# Patient Record
Sex: FEMALE | Race: WHITE | NOT HISPANIC OR LATINO | Employment: OTHER | URBAN - METROPOLITAN AREA
[De-identification: names, ages, dates, MRNs, and addresses within clinical notes are randomized per-mention and may not be internally consistent; named-entity substitution may affect disease eponyms.]

---

## 2019-09-16 DIAGNOSIS — I10 HYPERTENSION, UNSPECIFIED TYPE: Primary | ICD-10-CM

## 2019-09-16 RX ORDER — VERAPAMIL HYDROCHLORIDE 120 MG/1
120 TABLET, FILM COATED ORAL DAILY
Qty: 90 TABLET | Refills: 1 | Status: SHIPPED | OUTPATIENT
Start: 2019-09-16 | End: 2020-05-20

## 2019-09-16 RX ORDER — VERAPAMIL HYDROCHLORIDE 120 MG/1
1 TABLET, FILM COATED ORAL DAILY
COMMUNITY
End: 2019-09-16 | Stop reason: SDUPTHER

## 2019-09-16 NOTE — TELEPHONE ENCOUNTER
----- Message from Makeda Cody sent at 9/16/2019  9:21 AM CDT -----  Contact: Sophia Baum Verapemil  [facility].The patient has 3 pills left.  pts # [phone]

## 2019-10-10 ENCOUNTER — OFFICE VISIT (OUTPATIENT)
Dept: FAMILY MEDICINE | Facility: CLINIC | Age: 69
End: 2019-10-10
Payer: MEDICARE

## 2019-10-10 VITALS
WEIGHT: 123 LBS | HEIGHT: 59 IN | SYSTOLIC BLOOD PRESSURE: 136 MMHG | OXYGEN SATURATION: 99 % | DIASTOLIC BLOOD PRESSURE: 76 MMHG | BODY MASS INDEX: 24.8 KG/M2

## 2019-10-10 DIAGNOSIS — Z87.891 PERSONAL HISTORY OF NICOTINE DEPENDENCE: ICD-10-CM

## 2019-10-10 DIAGNOSIS — I10 ESSENTIAL HYPERTENSION: ICD-10-CM

## 2019-10-10 DIAGNOSIS — Z78.9 ELECTRONIC CIGARETTE USE: ICD-10-CM

## 2019-10-10 DIAGNOSIS — I34.1 MVP (MITRAL VALVE PROLAPSE): ICD-10-CM

## 2019-10-10 DIAGNOSIS — Z12.9 SCREENING FOR CANCER: ICD-10-CM

## 2019-10-10 DIAGNOSIS — F41.9 ANXIETY: ICD-10-CM

## 2019-10-10 DIAGNOSIS — E78.5 DYSLIPIDEMIA: ICD-10-CM

## 2019-10-10 DIAGNOSIS — Z87.891 FORMER SMOKER: ICD-10-CM

## 2019-10-10 DIAGNOSIS — R07.0 BURNING SENSATION OF THROAT: ICD-10-CM

## 2019-10-10 DIAGNOSIS — R00.2 PALPITATIONS: Primary | ICD-10-CM

## 2019-10-10 PROCEDURE — 93000 ELECTROCARDIOGRAM COMPLETE: CPT | Mod: S$GLB,,, | Performed by: NURSE PRACTITIONER

## 2019-10-10 PROCEDURE — 99214 PR OFFICE/OUTPT VISIT, EST, LEVL IV, 30-39 MIN: ICD-10-PCS | Mod: 25,S$GLB,, | Performed by: NURSE PRACTITIONER

## 2019-10-10 PROCEDURE — 99214 OFFICE O/P EST MOD 30 MIN: CPT | Mod: 25,S$GLB,, | Performed by: NURSE PRACTITIONER

## 2019-10-10 PROCEDURE — 93000 POCT EKG 12-LEAD: ICD-10-PCS | Mod: S$GLB,,, | Performed by: NURSE PRACTITIONER

## 2019-10-10 RX ORDER — VERAPAMIL HYDROCHLORIDE 120 MG/1
120 CAPSULE, EXTENDED RELEASE ORAL DAILY
COMMUNITY
End: 2019-11-19 | Stop reason: SDUPTHER

## 2019-10-10 RX ORDER — ROSUVASTATIN CALCIUM 5 MG/1
5 TABLET, COATED ORAL DAILY
COMMUNITY
End: 2019-11-19 | Stop reason: SDUPTHER

## 2019-10-10 RX ORDER — RALOXIFENE HYDROCHLORIDE 60 MG/1
60 TABLET, FILM COATED ORAL DAILY
COMMUNITY
End: 2019-11-19 | Stop reason: SDUPTHER

## 2019-10-10 RX ORDER — OMEPRAZOLE 40 MG/1
40 CAPSULE, DELAYED RELEASE ORAL DAILY
COMMUNITY
End: 2019-12-16 | Stop reason: SDUPTHER

## 2019-10-10 NOTE — PROGRESS NOTES
"  SUBJECTIVE:    Patient ID: Sophia Alberto is a 68 y.o. female.    Chief Complaint: Sore Throat    Pt here for sick visit- has numerous c/o's many of which have occurred for quite some time but seems to be occurring more frequently-   -reports past couple weeks has been feeling intermittent palpitations which will last a minute or two.   -Also has been having a constant burning in throat, has omeprazole but only takes occasionally. Denies any n/v. No relation of throat pain with exertion/activity  -Also reports pain to left mid back, "I feel like something is blocked", asking for CT of chest for lung CA screening as recommended by her sister  -Feels "pulse, throbbing pain" in her hands, this has gone on for several years    Had nuclear stress test 9/2018 with Dr. Cox which was normal. Had normal Holter in 2015 d/t palpitation c/o's. Reports hx of MVP  Hx of cigarette smoker- started age 16 and quit 2 years ago, hx of 1/2-3/4ppd, since she quit cigarettes starting using ecigs and most recently using Juul vaping cigarette so concerned about recent news reports about lung damage from Juuls  Pt denies any significant anxiety/stress though appears quite anxious      No visits with results within 6 Month(s) from this visit.   Latest known visit with results is:   No results found for any previous visit.       Past Medical History:   Diagnosis Date    Hyperlipidemia     Hypertension     Mitral valve prolapse      Past Surgical History:   Procedure Laterality Date    CHOLECYSTECTOMY      HYSTERECTOMY       History reviewed. No pertinent family history.    Marital Status:   Alcohol History:  has no alcohol history on file.  Tobacco History:  reports that she has been smoking vaping with nicotine. She has never used smokeless tobacco.  Drug History:  reports that she does not use drugs.    Review of patient's allergies indicates:   Allergen Reactions    Meperidine     Penicillins        Current " "Outpatient Medications:     omeprazole (PRILOSEC) 40 MG capsule, Take 40 mg by mouth once daily., Disp: , Rfl:     raloxifene (EVISTA) 60 mg tablet, Take 60 mg by mouth once daily., Disp: , Rfl:     rosuvastatin (CRESTOR) 5 MG tablet, Take 5 mg by mouth once daily., Disp: , Rfl:     verapamil (VERELAN) 120 MG C24P, Take 120 mg by mouth once daily., Disp: , Rfl:     verapamil (CALAN) 120 MG tablet, Take 1 tablet (120 mg total) by mouth once daily., Disp: 90 tablet, Rfl: 1    Review of Systems   Constitutional: Negative for chills, fever and unexpected weight change.   Respiratory: Positive for cough (reports chronic cough, nonproductive). Negative for shortness of breath and wheezing.    Cardiovascular: Positive for palpitations (see hpi). Negative for chest pain and leg swelling.   Gastrointestinal: Positive for constipation. Negative for abdominal pain, nausea and vomiting.   Genitourinary: Negative for dysuria.   Musculoskeletal: Negative for back pain (mid thoracic pain).   Skin: Negative for rash.   Neurological: Negative for dizziness, syncope, numbness and headaches.          Objective:      Vitals:    10/10/19 1359 10/10/19 1407 10/10/19 1431   BP: (!) 140/80 (!) 164/90 136/76   Pulse: (P) 78     SpO2: 99%     Weight: 55.8 kg (123 lb)     Height: 4' 11" (1.499 m)       Physical Exam   Constitutional: She is oriented to person, place, and time. She appears well-developed and well-nourished.   HENT:   Mouth/Throat: Oropharynx is clear and moist.   Neck: Neck supple. Carotid bruit is not present.   Cardiovascular: Normal rate and regular rhythm. Exam reveals no gallop and no friction rub.   No murmur heard.  Pulmonary/Chest: Effort normal and breath sounds normal. She has no wheezes. She has no rales.   Abdominal: Soft. She exhibits no distension. There is no tenderness.   Musculoskeletal: She exhibits no edema.   Neurological: She is alert and oriented to person, place, and time.   Psychiatric: Her mood " appears anxious.         Assessment:       1. Palpitations    2. Burning sensation of throat    3. Dyslipidemia    4. Essential hypertension    5. Anxiety    6. MVP (mitral valve prolapse)    7. Former smoker    8. Electronic cigarette use    9. Screening for cancer    10. Personal history of nicotine dependence            Plan:       Palpitations  -     POCT EKG 12-LEAD (NOT FOR [facility] USE)  -patient here today with numerous complaints though none of them sound like they are actually new to her.  EKG today reveals sinus rhythm without any acute ST or T-wave changes.  Discussed possible Holter monitor to rule out arrhythmia however she declines this states she has had before and nothing has ever showed up.  She denies any dizziness or presyncopal symptoms with the brief palpitations that she is experiencing.    Burning sensation of throat  -     POCT EKG 12-LEAD (NOT FOR [facility] USE)  -EKG without ischemic changes advised to take the omeprazole daily for the next 2-3 weeks see if this helps some her throat burning symptoms    Former smoker    Dyslipidemia  -well controlled on last labs however she is due for repeat labs    Essential hypertension  -Blood pressure improved on recheck    Anxiety-  Patient appears to be quite anxious however she denies anxiety or stress could be a possible contributing factor to any of her current complaints    Screening for cancer  -     CT Chest Lung Screening Low Dose; Future; Expected date: 10/10/2019    MVP (mitral valve prolapse)    Electronic cigarette use    Personal history of nicotine dependence   -     CT Chest Lung Screening Low Dose; Future; Expected date: 10/10/2019  -patient complaining of left thoracic pain, appears to be more muscular however she is quite concerned this could be pulmonary in origin- will send for lung cancer screening CT scan given her history of tobacco use    Follow up in about 3 weeks (around 10/31/2019).        10/10/2019 Hellen Gibbons NP

## 2019-10-11 LAB — EKG 12-LEAD: NORMAL

## 2019-10-15 ENCOUNTER — HOSPITAL ENCOUNTER (OUTPATIENT)
Dept: RADIOLOGY | Facility: HOSPITAL | Age: 69
Discharge: HOME | End: 2019-10-15
Attending: NURSE PRACTITIONER
Payer: MEDICARE

## 2019-10-15 DIAGNOSIS — Z87.891 PERSONAL HISTORY OF NICOTINE DEPENDENCE: ICD-10-CM

## 2019-10-15 DIAGNOSIS — Z12.9 SCREENING FOR CANCER: ICD-10-CM

## 2019-10-15 PROCEDURE — G0297 LDCT FOR LUNG CA SCREEN: HCPCS | Mod: TC,PO

## 2019-10-15 NOTE — PROGRESS NOTES
Please call pt and let her know CT scan of chest shows mild to moderate emphysema changes to upper lungs. No nodules or masses. No enlarged lymph nodes. Heart is normal size

## 2019-10-16 ENCOUNTER — TELEPHONE (OUTPATIENT)
Dept: FAMILY MEDICINE | Facility: CLINIC | Age: 69
End: 2019-10-16

## 2019-10-16 NOTE — TELEPHONE ENCOUNTER
----- Message from Hellen Gibbons NP sent at 10/15/2019  5:03 PM CDT -----  Please call pt and let her know CT scan of chest shows mild to moderate emphysema changes to upper lungs. No nodules or masses. No enlarged lymph nodes. Heart is normal size

## 2019-10-16 NOTE — TELEPHONE ENCOUNTER
Spoke to patient with information verbatim from Hellen. Regarding emphysema. Verbalized understanding on all.

## 2019-10-16 NOTE — TELEPHONE ENCOUNTER
Spoke to patient with results. States that she was unaware that she had emphysema. Wants to know if there is anything to be concerned about with this.

## 2019-10-16 NOTE — TELEPHONE ENCOUNTER
Recommend she quit all tobacco products including electronic cigs/vaping. If she has any SOB or exertion we can send in rx for albuterol INH to use as needed. Regular exercise can help maintain lung function also

## 2019-11-11 ENCOUNTER — TELEPHONE (OUTPATIENT)
Dept: FAMILY MEDICINE | Facility: CLINIC | Age: 69
End: 2019-11-11

## 2019-11-11 NOTE — TELEPHONE ENCOUNTER
----- Message from Carol Cm sent at 11/11/2019 11:13 AM CST -----  Contact: Sophia Remy  Patient left , she is returning your call.   Pt# [phone]

## 2019-11-11 NOTE — TELEPHONE ENCOUNTER
Spoke to patient that I was calling to advise her that she needs fasting lab work prior to 11/19 ov. Verbalized understanding.

## 2019-11-13 LAB
ALBUMIN SERPL-MCNC: 3.9 G/DL (ref 3.6–5.1)
ALBUMIN/CREAT UR: 8 MCG/MG CREAT
ALBUMIN/GLOB SERPL: 1.8 (CALC) (ref 1–2.5)
ALP SERPL-CCNC: 65 U/L (ref 33–130)
ALT SERPL-CCNC: 24 U/L (ref 6–29)
APPEARANCE UR: CLEAR
AST SERPL-CCNC: 21 U/L (ref 10–35)
BACTERIA #/AREA URNS HPF: NORMAL /HPF
BACTERIA UR CULT: NORMAL
BASOPHILS # BLD AUTO: 67 CELLS/UL (ref 0–200)
BASOPHILS NFR BLD AUTO: 0.9 %
BILIRUB SERPL-MCNC: 0.5 MG/DL (ref 0.2–1.2)
BILIRUB UR QL STRIP: NEGATIVE
BUN SERPL-MCNC: 12 MG/DL (ref 7–25)
BUN/CREAT SERPL: ABNORMAL (CALC) (ref 6–22)
CALCIUM SERPL-MCNC: 9.1 MG/DL (ref 8.6–10.4)
CHLORIDE SERPL-SCNC: 107 MMOL/L (ref 98–110)
CHOLEST SERPL-MCNC: 127 MG/DL
CHOLEST/HDLC SERPL: 2.3 (CALC)
CO2 SERPL-SCNC: 28 MMOL/L (ref 20–32)
COLOR UR: YELLOW
CREAT SERPL-MCNC: 0.75 MG/DL (ref 0.6–0.93)
CREAT UR-MCNC: 183 MG/DL (ref 20–275)
EOSINOPHIL # BLD AUTO: 170 CELLS/UL (ref 15–500)
EOSINOPHIL NFR BLD AUTO: 2.3 %
ERYTHROCYTE [DISTWIDTH] IN BLOOD BY AUTOMATED COUNT: 12.6 % (ref 11–15)
GFRSERPLBLD MDRD-ARVRAT: 81 ML/MIN/1.73M2
GLOBULIN SER CALC-MCNC: 2.2 G/DL (CALC) (ref 1.9–3.7)
GLUCOSE SERPL-MCNC: 110 MG/DL (ref 65–99)
GLUCOSE UR QL STRIP: NEGATIVE
HCT VFR BLD AUTO: 41.8 % (ref 35–45)
HDLC SERPL-MCNC: 56 MG/DL
HGB BLD-MCNC: 14.1 G/DL (ref 11.7–15.5)
HGB UR QL STRIP: NEGATIVE
HYALINE CASTS #/AREA URNS LPF: NORMAL /LPF
KETONES UR QL STRIP: NEGATIVE
LDLC SERPL CALC-MCNC: 53 MG/DL (CALC)
LEUKOCYTE ESTERASE UR QL STRIP: NEGATIVE
LYMPHOCYTES # BLD AUTO: 2279 CELLS/UL (ref 850–3900)
LYMPHOCYTES NFR BLD AUTO: 30.8 %
MCH RBC QN AUTO: 31.4 PG (ref 27–33)
MCHC RBC AUTO-ENTMCNC: 33.7 G/DL (ref 32–36)
MCV RBC AUTO: 93.1 FL (ref 80–100)
MICROALBUMIN UR-MCNC: 1.5 MG/DL
MONOCYTES # BLD AUTO: 555 CELLS/UL (ref 200–950)
MONOCYTES NFR BLD AUTO: 7.5 %
NEUTROPHILS # BLD AUTO: 4329 CELLS/UL (ref 1500–7800)
NEUTROPHILS NFR BLD AUTO: 58.5 %
NITRITE UR QL STRIP: NEGATIVE
NONHDLC SERPL-MCNC: 71 MG/DL (CALC)
PH UR STRIP: 6 [PH] (ref 5–8)
PLATELET # BLD AUTO: 253 THOUSAND/UL (ref 140–400)
PMV BLD REES-ECKER: 10.8 FL (ref 7.5–12.5)
POTASSIUM SERPL-SCNC: 4.3 MMOL/L (ref 3.5–5.3)
PROT SERPL-MCNC: 6.1 G/DL (ref 6.1–8.1)
PROT UR QL STRIP: NEGATIVE
RBC # BLD AUTO: 4.49 MILLION/UL (ref 3.8–5.1)
RBC #/AREA URNS HPF: NORMAL /HPF
SODIUM SERPL-SCNC: 143 MMOL/L (ref 135–146)
SP GR UR STRIP: 1.02 (ref 1–1.03)
SQUAMOUS #/AREA URNS HPF: NORMAL /HPF
TRIGL SERPL-MCNC: 98 MG/DL
TSH SERPL-ACNC: 1.49 MIU/L (ref 0.4–4.5)
WBC # BLD AUTO: 7.4 THOUSAND/UL (ref 3.8–10.8)
WBC #/AREA URNS HPF: NORMAL /HPF

## 2019-11-18 PROBLEM — K57.30 DIVERTICULOSIS OF LARGE INTESTINE WITHOUT PERFORATION OR ABSCESS WITHOUT BLEEDING: Status: ACTIVE | Noted: 2018-11-14

## 2019-11-18 PROBLEM — M17.10 ARTHRITIS OF KNEE: Status: ACTIVE | Noted: 2017-11-14

## 2019-11-19 ENCOUNTER — OFFICE VISIT (OUTPATIENT)
Dept: FAMILY MEDICINE | Facility: CLINIC | Age: 69
End: 2019-11-19
Payer: MEDICARE

## 2019-11-19 VITALS
DIASTOLIC BLOOD PRESSURE: 70 MMHG | HEART RATE: 88 BPM | WEIGHT: 122 LBS | SYSTOLIC BLOOD PRESSURE: 138 MMHG | BODY MASS INDEX: 24.6 KG/M2 | HEIGHT: 59 IN

## 2019-11-19 DIAGNOSIS — E03.9 ACQUIRED HYPOTHYROIDISM: ICD-10-CM

## 2019-11-19 DIAGNOSIS — M81.8 ADULT IDIOPATHIC GENERALIZED OSTEOPOROSIS: ICD-10-CM

## 2019-11-19 DIAGNOSIS — Z12.31 OTHER SCREENING MAMMOGRAM: ICD-10-CM

## 2019-11-19 DIAGNOSIS — F17.210 CIGARETTE NICOTINE DEPENDENCE WITHOUT COMPLICATION: ICD-10-CM

## 2019-11-19 DIAGNOSIS — M75.42 IMPINGEMENT SYNDROME OF LEFT SHOULDER: ICD-10-CM

## 2019-11-19 DIAGNOSIS — K21.9 GASTROESOPHAGEAL REFLUX DISEASE WITHOUT ESOPHAGITIS: ICD-10-CM

## 2019-11-19 DIAGNOSIS — I34.1 MVP (MITRAL VALVE PROLAPSE): ICD-10-CM

## 2019-11-19 DIAGNOSIS — I10 ESSENTIAL HYPERTENSION: ICD-10-CM

## 2019-11-19 DIAGNOSIS — R00.2 PALPITATIONS: ICD-10-CM

## 2019-11-19 DIAGNOSIS — E78.00 PURE HYPERCHOLESTEROLEMIA: Primary | ICD-10-CM

## 2019-11-19 PROCEDURE — 99214 PR OFFICE/OUTPT VISIT, EST, LEVL IV, 30-39 MIN: ICD-10-PCS | Mod: S$GLB,,, | Performed by: FAMILY MEDICINE

## 2019-11-19 PROCEDURE — 1159F MED LIST DOCD IN RCRD: CPT | Mod: S$GLB,,, | Performed by: FAMILY MEDICINE

## 2019-11-19 PROCEDURE — 99214 OFFICE O/P EST MOD 30 MIN: CPT | Mod: S$GLB,,, | Performed by: FAMILY MEDICINE

## 2019-11-19 PROCEDURE — 1159F PR MEDICATION LIST DOCUMENTED IN MEDICAL RECORD: ICD-10-PCS | Mod: S$GLB,,, | Performed by: FAMILY MEDICINE

## 2019-11-19 RX ORDER — AMLODIPINE BESYLATE 5 MG/1
5 TABLET ORAL DAILY
Qty: 90 TABLET | Refills: 1 | Status: SHIPPED | OUTPATIENT
Start: 2019-11-19 | End: 2020-05-20 | Stop reason: SDUPTHER

## 2019-11-19 RX ORDER — LEVOTHYROXINE SODIUM 50 UG/1
50 TABLET ORAL
Qty: 90 TABLET | Refills: 1 | Status: SHIPPED | OUTPATIENT
Start: 2019-11-19 | End: 2019-12-16

## 2019-11-19 RX ORDER — OMEPRAZOLE 40 MG/1
40 CAPSULE, DELAYED RELEASE ORAL DAILY
Qty: 90 CAPSULE | Refills: 3 | Status: CANCELLED | OUTPATIENT
Start: 2019-11-19

## 2019-11-19 RX ORDER — LEVOTHYROXINE SODIUM 50 UG/1
50 TABLET ORAL
Qty: 90 TABLET | Refills: 1 | Status: CANCELLED | OUTPATIENT
Start: 2019-11-19

## 2019-11-19 RX ORDER — RALOXIFENE HYDROCHLORIDE 60 MG/1
60 TABLET, FILM COATED ORAL DAILY
Qty: 90 TABLET | Refills: 1 | Status: CANCELLED | OUTPATIENT
Start: 2019-11-19

## 2019-11-19 RX ORDER — ROSUVASTATIN CALCIUM 5 MG/1
5 TABLET, COATED ORAL DAILY
Qty: 90 TABLET | Refills: 1 | Status: SHIPPED | OUTPATIENT
Start: 2019-11-19 | End: 2020-05-20 | Stop reason: SDUPTHER

## 2019-11-19 RX ORDER — RALOXIFENE HYDROCHLORIDE 60 MG/1
60 TABLET, FILM COATED ORAL DAILY
Qty: 90 TABLET | Refills: 1 | Status: SHIPPED | OUTPATIENT
Start: 2019-11-19 | End: 2020-05-20 | Stop reason: SDUPTHER

## 2019-11-19 RX ORDER — LEVOTHYROXINE SODIUM 50 UG/1
50 TABLET ORAL
COMMUNITY
Start: 2019-08-25 | End: 2019-11-19 | Stop reason: SDUPTHER

## 2019-11-19 RX ORDER — ROSUVASTATIN CALCIUM 5 MG/1
5 TABLET, COATED ORAL DAILY
Qty: 90 TABLET | Refills: 1 | Status: CANCELLED | OUTPATIENT
Start: 2019-11-19

## 2019-11-19 NOTE — PROGRESS NOTES
SUBJECTIVE:    Patient ID: Sophia Alberto is a 69 y.o. female.    Chief Complaint: Follow-up (bottles brought, wants to discuss changing Verapamil. refused flu shot-ac )    69 year old female presents for routine exam. Recent LDCT scan of lungs completed showing mild emphysema, otherwise no nodules. C/o continued L shoulder pain preventing full use of the arm. Denies taking any medications for the pain. Smoked cigarettes for 50 years and has recently taken up vaping nicotine instead. Requesting change verapamil to another medicine for blood pressure d/t unable to get it from her pharmacy.      Office Visit on 10/10/2019   Component Date Value Ref Range Status    Cholesterol 11/12/2019 127  <200 mg/dL Final    HDL 11/12/2019 56  >50 mg/dL Final    Triglycerides 11/12/2019 98  <150 mg/dL Final    LDL Cholesterol 11/12/2019 53  mg/dL (calc) Final    Hdl/Cholesterol Ratio 11/12/2019 2.3  <5.0 (calc) Final    Non HDL Chol. (LDL+VLDL) 11/12/2019 71  <130 mg/dL (calc) Final    TSH w/reflex to FT4 11/12/2019 1.49  0.40 - 4.50 mIU/L Final    WBC 11/12/2019 7.4  3.8 - 10.8 Thousand/uL Final    RBC 11/12/2019 4.49  3.80 - 5.10 Million/uL Final    Hemoglobin 11/12/2019 14.1  11.7 - 15.5 g/dL Final    Hematocrit 11/12/2019 41.8  35.0 - 45.0 % Final    Mean Corpuscular Volume 11/12/2019 93.1  80.0 - 100.0 fL Final    Mean Corpuscular Hemoglobin 11/12/2019 31.4  27.0 - 33.0 pg Final    Mean Corpuscular Hemoglobin Conc 11/12/2019 33.7  32.0 - 36.0 g/dL Final    RDW 11/12/2019 12.6  11.0 - 15.0 % Final    Platelets 11/12/2019 253  140 - 400 Thousand/uL Final    MPV 11/12/2019 10.8  7.5 - 12.5 fL Final    Neutrophils Absolute 11/12/2019 4,329  1,500 - 7,800 cells/uL Final    Lymph # 11/12/2019 2,279  850 - 3,900 cells/uL Final    Mono # 11/12/2019 555  200 - 950 cells/uL Final    Eos # 11/12/2019 170  15 - 500 cells/uL Final    Baso # 11/12/2019 67  0 - 200 cells/uL Final    Neutrophils Relative 11/12/2019  58.5  % Final    Lymph% 11/12/2019 30.8  % Final    Mono% 11/12/2019 7.5  % Final    Eosinophil% 11/12/2019 2.3  % Final    Basophil% 11/12/2019 0.9  % Final    Glucose 11/12/2019 110* 65 - 99 mg/dL Final    BUN, Bld 11/12/2019 12  7 - 25 mg/dL Final    Creatinine 11/12/2019 0.75  0.60 - 0.93 mg/dL Final    eGFR if non African American 11/12/2019 81  > OR = 60 mL/min/1.73m2 Final    eGFR if  11/12/2019 94  > OR = 60 mL/min/1.73m2 Final    BUN/Creatinine Ratio 11/12/2019 NOT APPLICABLE  6 - 22 (calc) Final    Sodium 11/12/2019 143  135 - 146 mmol/L Final    Potassium 11/12/2019 4.3  3.5 - 5.3 mmol/L Final    Chloride 11/12/2019 107  98 - 110 mmol/L Final    CO2 11/12/2019 28  20 - 32 mmol/L Final    Calcium 11/12/2019 9.1  8.6 - 10.4 mg/dL Final    Total Protein 11/12/2019 6.1  6.1 - 8.1 g/dL Final    Albumin 11/12/2019 3.9  3.6 - 5.1 g/dL Final    Globulin, Total 11/12/2019 2.2  1.9 - 3.7 g/dL (calc) Final    Albumin/Globulin Ratio 11/12/2019 1.8  1.0 - 2.5 (calc) Final    Total Bilirubin 11/12/2019 0.5  0.2 - 1.2 mg/dL Final    Alkaline Phosphatase 11/12/2019 65  33 - 130 U/L Final    AST 11/12/2019 21  10 - 35 U/L Final    ALT 11/12/2019 24  6 - 29 U/L Final    Color, UA 11/12/2019 YELLOW  YELLOW Final    Appearance, UA 11/12/2019 CLEAR  CLEAR Final    Specific [place]it, UA 11/12/2019 1.021  1.001 - 1.035 Final    pH, UA 11/12/2019 6.0  5.0 - 8.0 Final    Glucose, UA 11/12/2019 NEGATIVE  NEGATIVE Final    Bilirubin, UA 11/12/2019 NEGATIVE  NEGATIVE Final    Ketones, UA 11/12/2019 NEGATIVE  NEGATIVE Final    Occult Blood UA 11/12/2019 NEGATIVE  NEGATIVE Final    Protein, UA 11/12/2019 NEGATIVE  NEGATIVE Final    Nitrite, UA 11/12/2019 NEGATIVE  NEGATIVE Final    Leukocytes, UA 11/12/2019 NEGATIVE  NEGATIVE Final    WBC Casts, UA 11/12/2019 NONE SEEN  < OR = 5 /HPF Final    RBC Casts, UA 11/12/2019 0-2  < OR = 2 /HPF Final    Squam Epithel, UA 11/12/2019 0-5  < OR  = 5 /HPF Final    Bacteria, UA 11/12/2019 NONE SEEN  NONE SEEN /HPF Final    Hyaline Casts, UA 11/12/2019 NONE SEEN  NONE SEEN /LPF Final    Reflexive Urine Culture 11/12/2019 NO CULTURE INDICATED   Final    Creatinine, Random Ur 11/12/2019 183  20 - 275 mg/dL Final    Microalb, Ur 11/12/2019 1.5  See Note: mg/dL Final    Microalb Creat Ratio 11/12/2019 8  <30 mcg/mg creat Final       Past Medical History:   Diagnosis Date    History of nuclear stress test 2015    Normal    Hyperlipidemia     Hypertension     Mitral valve prolapse      Past Surgical History:   Procedure Laterality Date    CHOLECYSTECTOMY      COLONOSCOPY  2014    Dr. Avilez RTC 10 years    HYSTERECTOMY      LAPAROSCOPY       Family History   Problem Relation Age of Onset    Diabetes Mother     Heart disease Mother     Cancer Father     Breast cancer Maternal Grandmother        Marital Status:   Alcohol History:  has no alcohol history on file.  Tobacco History:  reports that she has been smoking vaping with nicotine. She has never used smokeless tobacco.  Drug History:  reports that she does not use drugs.    Review of patient's allergies indicates:   Allergen Reactions    Meperidine     Penicillins        Current Outpatient Medications:     omeprazole (PRILOSEC) 40 MG capsule, Take 40 mg by mouth once daily., Disp: , Rfl:     raloxifene (EVISTA) 60 mg tablet, Take 60 mg by mouth once daily., Disp: , Rfl:     rosuvastatin (CRESTOR) 5 MG tablet, Take 5 mg by mouth once daily., Disp: , Rfl:     SYNTHROID 50 mcg tablet, Take 50 mcg by mouth before breakfast. , Disp: , Rfl:     verapamil (CALAN) 120 MG tablet, Take 1 tablet (120 mg total) by mouth once daily., Disp: 90 tablet, Rfl: 1    Review of Systems   Constitutional: Negative for appetite change, chills, fatigue, fever and unexpected weight change.   HENT: Negative for congestion, ear pain, sinus pain, sore throat and trouble swallowing.    Eyes: Negative for pain,  "discharge and visual disturbance.   Respiratory: Negative for apnea, cough, shortness of breath and wheezing.         Uses vape pen with nicotine   Cardiovascular: Negative for chest pain, palpitations and leg swelling.   Gastrointestinal: Positive for constipation (requiring daily stool softeners.). Negative for abdominal pain, blood in stool, diarrhea, nausea and vomiting.        Uses omeprazole prn for symptoms of reflux   Endocrine: Negative for heat intolerance, polydipsia and polyuria.   Genitourinary: Negative for difficulty urinating, dyspareunia, dysuria, frequency, hematuria and menstrual problem.   Musculoskeletal: Positive for arthralgias (Pain in L shoulder limiting use. R knee pain.). Negative for back pain, gait problem, joint swelling and myalgias.   Allergic/Immunologic: Negative for environmental allergies, food allergies and immunocompromised state.   Neurological: Negative for dizziness, tremors, seizures, numbness and headaches.   Psychiatric/Behavioral: Negative for behavioral problems, confusion, hallucinations and suicidal ideas. The patient is not nervous/anxious.           Objective:      Vitals:    11/19/19 1119   BP: 138/70   Pulse: 88   Weight: 55.3 kg (122 lb)   Height: 4' 11" (1.499 m)     Body mass index is 24.64 kg/m².  Physical Exam   Constitutional: She is oriented to person, place, and time. She appears well-developed and well-nourished.   HENT:   Head: Normocephalic and atraumatic.   Right Ear: External ear normal.   Left Ear: External ear normal.   Nose: Nose normal.   Mouth/Throat: Oropharynx is clear and moist.   Eyes: Pupils are equal, round, and reactive to light. EOM are normal.   Neck: Normal range of motion. Neck supple. Carotid bruit is not present. No thyromegaly present.   Cardiovascular: Normal rate, regular rhythm, normal heart sounds and intact distal pulses.   No murmur heard.  Pulmonary/Chest: Effort normal and breath sounds normal. She has no wheezes. She has no " rales.   Abdominal: Soft. Bowel sounds are normal. She exhibits no distension. There is no hepatosplenomegaly. There is no tenderness.   Musculoskeletal: Normal range of motion. She exhibits no tenderness or deformity.        Lumbar back: Normal. She exhibits no pain and no spasm.   Bends 90 degrees at  Waist. 150 degree flexion of L shoulder. Limited ROM. Shaheen knees crepitant.   Lymphadenopathy:     She has no cervical adenopathy.   Neurological: She is alert and oriented to person, place, and time. No cranial nerve deficit. Coordination normal.   Skin: Skin is warm and dry. No rash noted.   Psychiatric: She has a normal mood and affect. Her behavior is normal. Judgment and thought content normal.   Nursing note and vitals reviewed.        Assessment:       1. Pure hypercholesterolemia    2. Essential hypertension    3. MVP (mitral valve prolapse)    4. Palpitations    5. Acquired hypothyroidism    6. Gastroesophageal reflux disease without esophagitis    7. Adult idiopathic generalized osteoporosis    8. Cigarette nicotine dependence without complication    9. Other screening mammogram    10. Impingement syndrome of left shoulder         Plan:       Pure hypercholesterolemia  - Cholesterol at goal. Continue rosuvastatin 5mg daily.    Essential hypertension  - Blood pressure at goal.  - Change Verapamil to Amlodipine 5mg daily.    MVP (mitral valve prolapse)  - Stress test in 2018. Stable at this time.    Palpitations    Acquired hypothyroidism  - TSH at goal. Continue levothyroxine 50mcg daily.    Gastroesophageal reflux disease without esophagitis  - Continue taking omeprazole as needed for symtpoms of reflux.    Adult idiopathic generalized osteoporosis  - Continue Raloxifene 60mg daily.  - DEXA due early next year.    Cigarette nicotine dependence without complication  - LDCT scan showed mild emphysema. No symptoms at this time.  - Counseled patient on risks associated with vaping nicotine.    Other screening  mammogram  -     Mammo Digital Screening Bilat w/ John; Future; Expected date: 11/19/2019    Impingement syndrome of left shoulder  - Instructed patient to take Advil as needed for pain.  - Given shoulder exercises to help strengthen shoulder and alleviate pain.  - Follow up if pain continues after using exercises or worsens.      Follow up in about 6 months (around 5/19/2020).

## 2019-11-25 ENCOUNTER — TELEPHONE (OUTPATIENT)
Dept: FAMILY MEDICINE | Facility: CLINIC | Age: 69
End: 2019-11-25

## 2019-11-25 DIAGNOSIS — R92.8 ABNORMAL SCREENING MAMMOGRAM: Primary | ICD-10-CM

## 2019-11-25 NOTE — TELEPHONE ENCOUNTER
She needs a diagnostic mammo with ultrasound. I placed the order for external mammogram and ultrasound. Is there anything else that needs to be done for [facility]?

## 2019-11-25 NOTE — TELEPHONE ENCOUNTER
----- Message from Cuca Thomas MA sent at 11/25/2019  7:59 AM CST -----  Regarding: Incomplete Mammogram      ----- Message -----  From: Raisa Toney  Sent: 11/24/2019   8:56 AM CST  To: Deacon Davies Staff    Screening mammogram 11/19/19

## 2019-11-25 NOTE — TELEPHONE ENCOUNTER
Yes, needs to be printed and faxed to [facility]. Pt needs to be called and a reminder needs to be set to make sure the additional views are done.

## 2019-12-02 ENCOUNTER — TELEPHONE (OUTPATIENT)
Dept: FAMILY MEDICINE | Facility: CLINIC | Age: 69
End: 2019-12-02

## 2019-12-02 DIAGNOSIS — R92.8 ABNORMAL SCREENING MAMMOGRAM: Primary | ICD-10-CM

## 2019-12-02 NOTE — TELEPHONE ENCOUNTER
----- Message from Sernee Britton sent at 12/2/2019  9:21 AM CST -----  [facility] is calling and they recommenced an us. We sent over a diag. mammo and no us. They need clarification on the order   [phone] fide

## 2019-12-02 NOTE — TELEPHONE ENCOUNTER
----- Message from Carol Cm sent at 12/2/2019  2:39 PM CST -----  Contact: Rosalind w/ [facility]  Pt has orders for a mammo to be and the radiologist recommended she gets a ultrasound. Rosalind would like to know does Dr. Parker would like for the patient to do both digital and ultrasound mammo? If he does, she says to please fax over the order  Rosalind's# [phone] Fax# [phone]

## 2019-12-03 ENCOUNTER — TELEPHONE (OUTPATIENT)
Dept: FAMILY MEDICINE | Facility: CLINIC | Age: 69
End: 2019-12-03

## 2019-12-03 NOTE — TELEPHONE ENCOUNTER
Spoke with pt and explained that she needs a diagnostic mammogram and u/s according to the report. Order was already put in by abbi. Pt will go today at 1

## 2019-12-03 NOTE — TELEPHONE ENCOUNTER
----- Message from Gonzalo Waller sent at 12/3/2019  9:43 AM CST -----  Pt is saying [facility] is unuse of what test the pt is suppose to be having pt wants to know why she is needing it.   Pt [phone]

## 2019-12-03 NOTE — TELEPHONE ENCOUNTER
----- Message from Hellen Perez sent at 12/3/2019  9:42 AM CST -----  Pt has an appt today @ 12:00. [facility] needs an order for a right breast ultrasound. Fax #[phone].

## 2019-12-05 ENCOUNTER — TELEPHONE (OUTPATIENT)
Dept: FAMILY MEDICINE | Facility: CLINIC | Age: 69
End: 2019-12-05

## 2019-12-05 NOTE — TELEPHONE ENCOUNTER
----- Message from Cuca Thomas MA sent at 12/4/2019 10:28 AM CST -----      ----- Message -----  From: Merlene Dey  Sent: 12/4/2019  10:00 AM CST  To: Elvin Gonzalez Staff    RADIOLOGY, D.I.S. 12/03/19

## 2019-12-05 NOTE — TELEPHONE ENCOUNTER
She has a normal diagnostic mammo with ultrasound. We will repeat another in 6 months to document stability.

## 2019-12-16 DIAGNOSIS — K21.9 GASTROESOPHAGEAL REFLUX DISEASE WITHOUT ESOPHAGITIS: Primary | ICD-10-CM

## 2019-12-16 DIAGNOSIS — E03.9 ACQUIRED HYPOTHYROIDISM: Primary | ICD-10-CM

## 2019-12-16 RX ORDER — OMEPRAZOLE 40 MG/1
40 CAPSULE, DELAYED RELEASE ORAL DAILY
Qty: 90 CAPSULE | Refills: 3 | Status: SHIPPED | OUTPATIENT
Start: 2019-12-16 | End: 2020-03-06 | Stop reason: SDUPTHER

## 2019-12-16 RX ORDER — LEVOTHYROXINE SODIUM 50 UG/1
50 TABLET ORAL DAILY
Qty: 30 TABLET | Refills: 11 | Status: SHIPPED | OUTPATIENT
Start: 2019-12-16 | End: 2019-12-17

## 2019-12-16 NOTE — TELEPHONE ENCOUNTER
----- Message from Carol Cm sent at 12/16/2019  9:20 AM CST -----  Contact: Sophia Alberto  Pt says that [facility] is telling her that she now needs a PA for the synthroid if not then she has to get the generic Levothyroxine sent into instead. Pt says that she's been taking Synthroid for a while now and isn't sure if she should take the generic or stick with what she knows. She says she doesn't have a problem with taking the generic because it is more affordable for her .   [phone]  Pt# [phone]

## 2019-12-16 NOTE — TELEPHONE ENCOUNTER
----- Message from Hellen Perez sent at 12/16/2019  9:00 AM CST -----  Refill for omeprazole (PRILOSEC) 40 MG capsule. [facility]. Pt #[phone]

## 2019-12-16 NOTE — TELEPHONE ENCOUNTER
Left message to advise pt that we will [facility] and that we need to recheck TSH in 8 weeks after starting 50 mcg.

## 2019-12-16 NOTE — TELEPHONE ENCOUNTER
I am ok with the generic form (levothyroxine) we will need to recheck TSH in 8 weeks after starting 50mcg.

## 2019-12-17 RX ORDER — LEVOTHYROXINE SODIUM 50 UG/1
50 TABLET ORAL DAILY
Qty: 90 TABLET | Refills: 0 | Status: SHIPPED | OUTPATIENT
Start: 2019-12-17 | End: 2020-03-06 | Stop reason: SDUPTHER

## 2019-12-17 NOTE — TELEPHONE ENCOUNTER
Pt has been notified and verbalized understanding.  Pt requested a 90 day supply be ordered.    Pended below.

## 2019-12-27 ENCOUNTER — TELEPHONE (OUTPATIENT)
Dept: FAMILY MEDICINE | Facility: CLINIC | Age: 69
End: 2019-12-27

## 2019-12-27 ENCOUNTER — CLINICAL SUPPORT (OUTPATIENT)
Dept: FAMILY MEDICINE | Facility: CLINIC | Age: 69
End: 2019-12-27
Payer: MEDICARE

## 2019-12-27 DIAGNOSIS — R30.0 DYSURIA: Primary | ICD-10-CM

## 2019-12-27 LAB
BILIRUB UR QL STRIP: NEGATIVE
GLUCOSE UR QL STRIP: NEGATIVE
KETONES UR QL STRIP: NEGATIVE
LEUKOCYTE ESTERASE UR QL STRIP: NEGATIVE
PH, POC UA: 6
POC BLOOD, URINE: NEGATIVE
POC NITRATES, URINE: NEGATIVE
PROT UR QL STRIP: NEGATIVE
SP GR UR STRIP: 1.01 (ref 1–1.03)
UROBILINOGEN UR STRIP-ACNC: 0.2 (ref 0.1–1.1)

## 2019-12-27 PROCEDURE — 81003 URINALYSIS AUTO W/O SCOPE: CPT | Mod: QW,S$GLB,, | Performed by: NURSE PRACTITIONER

## 2019-12-27 PROCEDURE — 81003 POCT URINALYSIS, DIPSTICK, AUTOMATED, W/O SCOPE: ICD-10-PCS | Mod: QW,S$GLB,, | Performed by: NURSE PRACTITIONER

## 2019-12-27 NOTE — TELEPHONE ENCOUNTER
----- Message from Hellen Perez sent at 12/27/2019  9:20 AM CST -----  Pt thinks she has a UTI and either wants to be seen today or have something called in. [facility] on [address] Pt #[phone]

## 2019-12-30 LAB — BACTERIA UR CULT: ABNORMAL

## 2019-12-31 ENCOUNTER — TELEPHONE (OUTPATIENT)
Dept: FAMILY MEDICINE | Facility: CLINIC | Age: 69
End: 2019-12-31

## 2019-12-31 DIAGNOSIS — R30.0 DYSURIA: Primary | ICD-10-CM

## 2019-12-31 RX ORDER — CIPROFLOXACIN 250 MG/1
250 TABLET, FILM COATED ORAL 2 TIMES DAILY
Qty: 10 TABLET | Refills: 0 | Status: SHIPPED | OUTPATIENT
Start: 2019-12-31 | End: 2020-01-05

## 2019-12-31 NOTE — TELEPHONE ENCOUNTER
Spoke to patient with results verbatim per Hellen. States that the reason she came in was because she was having a UTI and she is still feeling symptomatic. Cipro pended.

## 2019-12-31 NOTE — PROGRESS NOTES
Please call pt and let her know final urine culture shows moderate level of bacteria- if she is having symptoms of UTI can treat her with cipro 250mg BID for 5 days.

## 2019-12-31 NOTE — TELEPHONE ENCOUNTER
----- Message from Hellen Gibbons NP sent at 12/30/2019  9:06 PM CST -----  Please call pt and let her know final urine culture shows moderate level of bacteria- if she is having symptoms of UTI can treat her with cipro 250mg BID for 5 days.

## 2020-03-06 DIAGNOSIS — K21.9 GASTROESOPHAGEAL REFLUX DISEASE WITHOUT ESOPHAGITIS: ICD-10-CM

## 2020-03-06 DIAGNOSIS — E03.9 ACQUIRED HYPOTHYROIDISM: ICD-10-CM

## 2020-03-06 RX ORDER — LEVOTHYROXINE SODIUM 50 UG/1
50 TABLET ORAL DAILY
Qty: 90 TABLET | Refills: 1 | Status: SHIPPED | OUTPATIENT
Start: 2020-03-06 | End: 2021-03-01 | Stop reason: SDUPTHER

## 2020-03-06 RX ORDER — OMEPRAZOLE 40 MG/1
40 CAPSULE, DELAYED RELEASE ORAL DAILY
Qty: 90 CAPSULE | Refills: 1 | Status: SHIPPED | OUTPATIENT
Start: 2020-03-06 | End: 2020-11-18 | Stop reason: SDUPTHER

## 2020-03-06 NOTE — TELEPHONE ENCOUNTER
----- Message from Gonzalo Waller sent at 3/6/2020  9:20 AM CST -----  Levothyroxine and acid reflux med   [facility]   Pt [phone]

## 2020-05-13 ENCOUNTER — TELEPHONE (OUTPATIENT)
Dept: FAMILY MEDICINE | Facility: CLINIC | Age: 70
End: 2020-05-13

## 2020-05-13 NOTE — TELEPHONE ENCOUNTER
Spoke with pt, states she does have access to a smart phone but she does not want to do that. She wants to come in for this visit only. States she has a lot of things she needs to discuss with Dr. Davies that she has been putting off until this visit.

## 2020-05-15 ENCOUNTER — TELEPHONE (OUTPATIENT)
Dept: FAMILY MEDICINE | Facility: CLINIC | Age: 70
End: 2020-05-15

## 2020-05-19 LAB
ALBUMIN SERPL-MCNC: 4.1 G/DL (ref 3.6–5.1)
ALBUMIN/GLOB SERPL: 1.9 (CALC) (ref 1–2.5)
ALP SERPL-CCNC: 73 U/L (ref 37–153)
ALT SERPL-CCNC: 23 U/L (ref 6–29)
AST SERPL-CCNC: 21 U/L (ref 10–35)
BILIRUB SERPL-MCNC: 0.4 MG/DL (ref 0.2–1.2)
BUN SERPL-MCNC: 12 MG/DL (ref 7–25)
BUN/CREAT SERPL: ABNORMAL (CALC) (ref 6–22)
CALCIUM SERPL-MCNC: 9.1 MG/DL (ref 8.6–10.4)
CHLORIDE SERPL-SCNC: 106 MMOL/L (ref 98–110)
CHOLEST SERPL-MCNC: 149 MG/DL
CHOLEST/HDLC SERPL: 2.3 (CALC)
CO2 SERPL-SCNC: 29 MMOL/L (ref 20–32)
CREAT SERPL-MCNC: 0.73 MG/DL (ref 0.6–0.93)
GFRSERPLBLD MDRD-ARVRAT: 83 ML/MIN/1.73M2
GLOBULIN SER CALC-MCNC: 2.2 G/DL (CALC) (ref 1.9–3.7)
GLUCOSE SERPL-MCNC: 110 MG/DL (ref 65–99)
HDLC SERPL-MCNC: 64 MG/DL
LDLC SERPL CALC-MCNC: 65 MG/DL (CALC)
NONHDLC SERPL-MCNC: 85 MG/DL (CALC)
POTASSIUM SERPL-SCNC: 4.1 MMOL/L (ref 3.5–5.3)
PROT SERPL-MCNC: 6.3 G/DL (ref 6.1–8.1)
SODIUM SERPL-SCNC: 143 MMOL/L (ref 135–146)
TRIGL SERPL-MCNC: 114 MG/DL
TSH SERPL-ACNC: 1.89 MIU/L (ref 0.4–4.5)

## 2020-05-20 ENCOUNTER — OFFICE VISIT (OUTPATIENT)
Dept: FAMILY MEDICINE | Facility: CLINIC | Age: 70
End: 2020-05-20
Payer: MEDICARE

## 2020-05-20 VITALS
HEART RATE: 88 BPM | TEMPERATURE: 99 F | DIASTOLIC BLOOD PRESSURE: 70 MMHG | BODY MASS INDEX: 24.8 KG/M2 | WEIGHT: 123 LBS | SYSTOLIC BLOOD PRESSURE: 126 MMHG | HEIGHT: 59 IN

## 2020-05-20 DIAGNOSIS — K57.30 DIVERTICULOSIS OF LARGE INTESTINE WITHOUT PERFORATION OR ABSCESS WITHOUT BLEEDING: ICD-10-CM

## 2020-05-20 DIAGNOSIS — I34.1 MVP (MITRAL VALVE PROLAPSE): ICD-10-CM

## 2020-05-20 DIAGNOSIS — K21.9 GASTROESOPHAGEAL REFLUX DISEASE WITHOUT ESOPHAGITIS: ICD-10-CM

## 2020-05-20 DIAGNOSIS — I10 ESSENTIAL HYPERTENSION: ICD-10-CM

## 2020-05-20 DIAGNOSIS — R10.32 LLQ PAIN: Primary | ICD-10-CM

## 2020-05-20 DIAGNOSIS — M75.42 IMPINGEMENT SYNDROME OF LEFT SHOULDER: ICD-10-CM

## 2020-05-20 DIAGNOSIS — R00.2 PALPITATIONS: ICD-10-CM

## 2020-05-20 DIAGNOSIS — K59.01 SLOW TRANSIT CONSTIPATION: ICD-10-CM

## 2020-05-20 DIAGNOSIS — E78.00 PURE HYPERCHOLESTEROLEMIA: ICD-10-CM

## 2020-05-20 DIAGNOSIS — E03.9 ACQUIRED HYPOTHYROIDISM: ICD-10-CM

## 2020-05-20 DIAGNOSIS — L72.3 SEBACEOUS CYST: ICD-10-CM

## 2020-05-20 DIAGNOSIS — M81.8 ADULT IDIOPATHIC GENERALIZED OSTEOPOROSIS: ICD-10-CM

## 2020-05-20 DIAGNOSIS — M17.10 ARTHRITIS OF KNEE: ICD-10-CM

## 2020-05-20 DIAGNOSIS — F17.210 CIGARETTE NICOTINE DEPENDENCE WITHOUT COMPLICATION: ICD-10-CM

## 2020-05-20 PROCEDURE — 99214 OFFICE O/P EST MOD 30 MIN: CPT | Mod: S$GLB,,, | Performed by: FAMILY MEDICINE

## 2020-05-20 PROCEDURE — 99214 PR OFFICE/OUTPT VISIT, EST, LEVL IV, 30-39 MIN: ICD-10-PCS | Mod: S$GLB,,, | Performed by: FAMILY MEDICINE

## 2020-05-20 RX ORDER — RALOXIFENE HYDROCHLORIDE 60 MG/1
60 TABLET, FILM COATED ORAL DAILY
Qty: 90 TABLET | Refills: 1 | Status: SHIPPED | OUTPATIENT
Start: 2020-05-20 | End: 2020-11-18 | Stop reason: SDUPTHER

## 2020-05-20 RX ORDER — AMLODIPINE BESYLATE 5 MG/1
5 TABLET ORAL DAILY
Qty: 90 TABLET | Refills: 1 | Status: SHIPPED | OUTPATIENT
Start: 2020-05-20 | End: 2020-11-18 | Stop reason: SDUPTHER

## 2020-05-20 RX ORDER — ROSUVASTATIN CALCIUM 5 MG/1
5 TABLET, COATED ORAL DAILY
Qty: 90 TABLET | Refills: 1 | Status: SHIPPED | OUTPATIENT
Start: 2020-05-20 | End: 2020-11-18 | Stop reason: SDUPTHER

## 2020-05-20 NOTE — PROGRESS NOTES
SUBJECTIVE:    Patient ID: Sophia Alberto is a 69 y.o. female.    Chief Complaint: Follow-up (6 month, lab work, a slew of things to go over, brought bottles// SW)    This 69-year-old female has been self isolating at home due to the COVID virus crisis.  She does go out on grocery trips with her .  For exercise she is walking a little bit in the neighborhood and doing house chores and cooking.  She has had no upper respiratory symptoms or fevers lately.    She does complain of several years of intermittent left lower quadrant pains all the time she feels pressure and discomfort.  She takes daily stool softeners and Senokot for constipation.  Or I wont have a bowel movement no bloody stools.  She does occasionally have to resort to Dulcolax tablets or Mag citrate 1 bottle.  She does not recall having any bouts of diverticulitis.    She felt jittery and had some heart racing so she decreased her Synthroid 50 mg to half a tablet a day on her own.  She states she now feels better and has less jitteriness.    Her left shoulder has some rotator cuff impingement but she seems to be tolerating this well.  She sleeps well with no pain medicine at night.      Clinical Support on 12/27/2019   Component Date Value Ref Range Status    POC Blood, Urine 12/27/2019 Negative  Negative Final    POC Bilirubin, Urine 12/27/2019 Negative  Negative Final    POC Urobilinogen, Urine 12/27/2019 0.2  0.1 - 1.1 Final    POC Ketones, Urine 12/27/2019 Negative  Negative Final    POC Protein, Urine 12/27/2019 Negative  Negative Final    POC Nitrates, Urine 12/27/2019 Negative  Negative Final    POC Glucose, Urine 12/27/2019 Negative  Negative Final    pH, UA 12/27/2019 6.0   Final    POC Specific Gravity, Urine 12/27/2019 1.010  1.003 - 1.029 Final    POC Leukocytes, Urine 12/27/2019 Negative  Negative Final    Urine Culture, Routine 12/27/2019 *  Final       Past Medical History:   Diagnosis Date    History of  nuclear stress test 2015    Normal    Hyperlipidemia     Hypertension     Mitral valve prolapse      Past Surgical History:   Procedure Laterality Date    CHOLECYSTECTOMY      COLONOSCOPY  2014    Dr. Avilez RTC 10 years    HYSTERECTOMY      LAPAROSCOPY       Family History   Problem Relation Age of Onset    Diabetes Mother     Heart disease Mother     Cancer Father     Breast cancer Maternal Grandmother        Marital Status:   Alcohol History:  has no alcohol history on file.  Tobacco History:  reports that she has been smoking vaping with nicotine. She has never used smokeless tobacco.  Drug History:  reports that she does not use drugs.    Review of patient's allergies indicates:   Allergen Reactions    Meperidine     Penicillins        Current Outpatient Medications:     amLODIPine (NORVASC) 5 MG tablet, Take 1 tablet (5 mg total) by mouth once daily., Disp: 90 tablet, Rfl: 1    levothyroxine (SYNTHROID) 50 MCG tablet, Take 1 tablet (50 mcg total) by mouth once daily., Disp: 90 tablet, Rfl: 1    omeprazole (PRILOSEC) 40 MG capsule, Take 1 capsule (40 mg total) by mouth once daily., Disp: 90 capsule, Rfl: 1    raloxifene (EVISTA) 60 mg tablet, Take 1 tablet (60 mg total) by mouth once daily., Disp: 90 tablet, Rfl: 1    rosuvastatin (CRESTOR) 5 MG tablet, Take 1 tablet (5 mg total) by mouth once daily., Disp: 90 tablet, Rfl: 1    Review of Systems   Constitutional: Negative for appetite change, chills, fatigue, fever and unexpected weight change.   HENT: Negative for congestion, ear pain, sinus pain, sore throat and trouble swallowing.    Eyes: Negative for pain, discharge and visual disturbance.   Respiratory: Negative for apnea, cough, shortness of breath and wheezing.    Cardiovascular: Negative for chest pain, palpitations and leg swelling.   Gastrointestinal: Negative for abdominal pain (LLQ pains freq,), blood in stool, constipation, diarrhea, nausea and vomiting.   Endocrine:  "Negative for heat intolerance, polydipsia and polyuria.   Genitourinary: Negative for difficulty urinating, dyspareunia, dysuria, frequency, hematuria and menstrual problem.   Musculoskeletal: Negative for arthralgias, back pain, gait problem, joint swelling and myalgias.   Skin: Positive for wound (Sebaceous cyst on her mid lower back is been present for months.  She is to see Dr. Gee for Dermatology).   Allergic/Immunologic: Negative for environmental allergies, food allergies and immunocompromised state.   Neurological: Negative for dizziness, tremors, seizures, numbness and headaches.   Psychiatric/Behavioral: Negative for behavioral problems, confusion, hallucinations and suicidal ideas. The patient is not nervous/anxious.           Objective:      Vitals:    05/20/20 1028   BP: 126/70   Pulse: 88   Temp: 98.7 °F (37.1 °C)   Weight: 55.8 kg (123 lb)   Height: 4' 11" (1.499 m)     Body mass index is 24.84 kg/m².  Physical Exam   Constitutional: She is oriented to person, place, and time. She appears well-developed and well-nourished.   HENT:   Head: Normocephalic and atraumatic.   Right Ear: External ear normal.   Left Ear: External ear normal.   Nose: Nose normal.   Mouth/Throat: Oropharynx is clear and moist.   Eyes: Pupils are equal, round, and reactive to light. EOM are normal.   Neck: Normal range of motion. Neck supple. Carotid bruit is not present. No thyromegaly present.   Cardiovascular: Normal rate, regular rhythm, normal heart sounds and intact distal pulses.   No murmur heard.  Pulmonary/Chest: Effort normal and breath sounds normal. She has no wheezes. She has no rales.   Abdominal: Soft. Bowel sounds are normal. She exhibits no distension and no mass. There is no hepatosplenomegaly. There is no tenderness (Mildly tender in the left lower quadrant.  No distention or firmness at all.). There is no rebound and no guarding.   Musculoskeletal: Normal range of motion. She exhibits no tenderness or " deformity.        Lumbar back: Normal. She exhibits no pain and no spasm.   Bends 90 degrees at  waist left shoulder has decreased flexion 150°.  Internal rotation seems adequate.  Knees have good range of motion with some crepitance no peripheral edema is noted   Lymphadenopathy:     She has no cervical adenopathy.   Neurological: She is alert and oriented to person, place, and time. No cranial nerve deficit. Coordination normal.   Skin: Skin is warm and dry. No rash noted.   2 x 3 cm sebaceous cyst is present in the mid lumbar spine area   Psychiatric: She has a normal mood and affect. Her behavior is normal. Judgment and thought content normal.   Nursing note and vitals reviewed.        Assessment:       1. LLQ pain    2. Essential hypertension    3. Adult idiopathic generalized osteoporosis    4. Pure hypercholesterolemia    5. Acquired hypothyroidism    6. Slow transit constipation    7. Gastroesophageal reflux disease without esophagitis    8. Diverticulosis of large intestine without perforation or abscess without bleeding    9. Arthritis of knee    10. Cigarette nicotine dependence without complication    11. Palpitations    12. MVP (mitral valve prolapse)    13. Impingement syndrome of left shoulder    14. Sebaceous cyst         Plan:       LLQ pain  -     CT Abdomen Pelvis W Wo Contrast; Future; Expected date: 05/20/2020  Patient has chronic left lower quadrant abdominal pains.  We need a CT scan to rule out diverticulitis, perforation, bowel stricture, constipation  Essential hypertension  -     amLODIPine (NORVASC) 5 MG tablet; Take 1 tablet (5 mg total) by mouth once daily.  Dispense: 90 tablet; Refill: 1  Blood pressure well controlled continue amlodipine  Adult idiopathic generalized osteoporosis  -     raloxifene (EVISTA) 60 mg tablet; Take 1 tablet (60 mg total) by mouth once daily.  Dispense: 90 tablet; Refill: 1  Continue Evista  Pure hypercholesterolemia  -     rosuvastatin (CRESTOR) 5 MG tablet;  Take 1 tablet (5 mg total) by mouth once daily.  Dispense: 90 tablet; Refill: 1  Cholesterol at goal, continue current medications  Acquired hypothyroidism  TSH now normal at 1.89 continue half a tablet of Synthroid 50 mcg daily  Slow transit constipation  Continue Senokot and stool softeners.  May have to increase to Linzess in the future  Gastroesophageal reflux disease without esophagitis    Diverticulosis of large intestine without perforation or abscess without bleeding    Arthritis of knee    Cigarette nicotine dependence without complication    Palpitations    MVP (mitral valve prolapse)    Impingement syndrome of left shoulder  She does not wish to pursue any intervention at this time  Sebaceous cyst  See  Dermatology at her discretion    No follow-ups on file.

## 2020-05-27 ENCOUNTER — TELEPHONE (OUTPATIENT)
Dept: FAMILY MEDICINE | Facility: CLINIC | Age: 70
End: 2020-05-27

## 2020-05-27 DIAGNOSIS — R10.32 LLQ PAIN: Primary | ICD-10-CM

## 2020-05-27 DIAGNOSIS — K57.30 DIVERTICULOSIS OF LARGE INTESTINE WITHOUT PERFORATION OR ABSCESS WITHOUT BLEEDING: ICD-10-CM

## 2020-05-27 NOTE — TELEPHONE ENCOUNTER
----- Message from Maggie Zelaya sent at 5/27/2020 11:17 AM CDT -----   [facility], [facility] calling for new orders for CT of the abdomen and pelvis w/o contrast  fax [phone] and phone #  [phone]

## 2020-05-27 NOTE — TELEPHONE ENCOUNTER
I called patient today with the results of her CT of abdomen and pelvis.  It shows extensive colonic diverticulosis involving the ascending colon, transverse colon, descending colon, and the sigmoid colon.  No evidence of diverticulitis or diverticular abscess.  Patient was advised to stay on Senokot and stool softeners and maintain good bowel habits on a high-fiber diet.  She stated she understood.

## 2020-05-27 NOTE — TELEPHONE ENCOUNTER
----- Message from Gonzalo Waller sent at 5/27/2020  9:56 AM CDT -----  [facility] is needing clarification a pt order's pt is there now   [phone]

## 2020-08-14 ENCOUNTER — PES CALL (OUTPATIENT)
Dept: ADMINISTRATIVE | Facility: CLINIC | Age: 70
End: 2020-08-14

## 2020-11-04 ENCOUNTER — TELEPHONE (OUTPATIENT)
Dept: FAMILY MEDICINE | Facility: CLINIC | Age: 70
End: 2020-11-04

## 2020-11-04 NOTE — TELEPHONE ENCOUNTER
LMOR that fasting lab is due prior to 11/18 office visit, orders at [facility] and to try to have drawn next week.

## 2020-11-06 LAB
ALBUMIN SERPL-MCNC: 4.2 G/DL (ref 3.6–5.1)
ALBUMIN/GLOB SERPL: 2 (CALC) (ref 1–2.5)
ALP SERPL-CCNC: 66 U/L (ref 37–153)
ALT SERPL-CCNC: 17 U/L (ref 6–29)
AST SERPL-CCNC: 17 U/L (ref 10–35)
BASOPHILS # BLD AUTO: 47 CELLS/UL (ref 0–200)
BASOPHILS NFR BLD AUTO: 0.6 %
BILIRUB SERPL-MCNC: 0.6 MG/DL (ref 0.2–1.2)
BUN SERPL-MCNC: 15 MG/DL (ref 7–25)
BUN/CREAT SERPL: ABNORMAL (CALC) (ref 6–22)
CALCIUM SERPL-MCNC: 8.9 MG/DL (ref 8.6–10.4)
CHLORIDE SERPL-SCNC: 107 MMOL/L (ref 98–110)
CHOLEST SERPL-MCNC: 143 MG/DL
CHOLEST/HDLC SERPL: 2.3 (CALC)
CO2 SERPL-SCNC: 32 MMOL/L (ref 20–32)
CREAT SERPL-MCNC: 0.66 MG/DL (ref 0.6–0.93)
EOSINOPHIL # BLD AUTO: 111 CELLS/UL (ref 15–500)
EOSINOPHIL NFR BLD AUTO: 1.4 %
ERYTHROCYTE [DISTWIDTH] IN BLOOD BY AUTOMATED COUNT: 12.9 % (ref 11–15)
GFRSERPLBLD MDRD-ARVRAT: 89 ML/MIN/1.73M2
GLOBULIN SER CALC-MCNC: 2.1 G/DL (CALC) (ref 1.9–3.7)
GLUCOSE SERPL-MCNC: 113 MG/DL (ref 65–99)
HCT VFR BLD AUTO: 43.5 % (ref 35–45)
HDLC SERPL-MCNC: 63 MG/DL
HGB BLD-MCNC: 14.4 G/DL (ref 11.7–15.5)
LDLC SERPL CALC-MCNC: 59 MG/DL (CALC)
LYMPHOCYTES # BLD AUTO: 1904 CELLS/UL (ref 850–3900)
LYMPHOCYTES NFR BLD AUTO: 24.1 %
MCH RBC QN AUTO: 31 PG (ref 27–33)
MCHC RBC AUTO-ENTMCNC: 33.1 G/DL (ref 32–36)
MCV RBC AUTO: 93.8 FL (ref 80–100)
MONOCYTES # BLD AUTO: 624 CELLS/UL (ref 200–950)
MONOCYTES NFR BLD AUTO: 7.9 %
NEUTROPHILS # BLD AUTO: 5214 CELLS/UL (ref 1500–7800)
NEUTROPHILS NFR BLD AUTO: 66 %
NONHDLC SERPL-MCNC: 80 MG/DL (CALC)
PLATELET # BLD AUTO: 226 THOUSAND/UL (ref 140–400)
PMV BLD REES-ECKER: 10.4 FL (ref 7.5–12.5)
POTASSIUM SERPL-SCNC: 4.5 MMOL/L (ref 3.5–5.3)
PROT SERPL-MCNC: 6.3 G/DL (ref 6.1–8.1)
RBC # BLD AUTO: 4.64 MILLION/UL (ref 3.8–5.1)
SODIUM SERPL-SCNC: 143 MMOL/L (ref 135–146)
TRIGL SERPL-MCNC: 119 MG/DL
TSH SERPL-ACNC: 2.18 MIU/L (ref 0.4–4.5)
WBC # BLD AUTO: 7.9 THOUSAND/UL (ref 3.8–10.8)

## 2020-11-16 ENCOUNTER — PES CALL (OUTPATIENT)
Dept: ADMINISTRATIVE | Facility: CLINIC | Age: 70
End: 2020-11-16

## 2020-11-18 ENCOUNTER — OFFICE VISIT (OUTPATIENT)
Dept: FAMILY MEDICINE | Facility: CLINIC | Age: 70
End: 2020-11-18
Payer: MEDICARE

## 2020-11-18 VITALS
SYSTOLIC BLOOD PRESSURE: 128 MMHG | WEIGHT: 122 LBS | OXYGEN SATURATION: 96 % | HEIGHT: 59 IN | DIASTOLIC BLOOD PRESSURE: 72 MMHG | HEART RATE: 72 BPM | BODY MASS INDEX: 24.6 KG/M2

## 2020-11-18 DIAGNOSIS — E03.9 ACQUIRED HYPOTHYROIDISM: ICD-10-CM

## 2020-11-18 DIAGNOSIS — I34.1 MVP (MITRAL VALVE PROLAPSE): ICD-10-CM

## 2020-11-18 DIAGNOSIS — F17.210 CIGARETTE NICOTINE DEPENDENCE WITHOUT COMPLICATION: ICD-10-CM

## 2020-11-18 DIAGNOSIS — L72.3 SEBACEOUS CYST: ICD-10-CM

## 2020-11-18 DIAGNOSIS — I10 ESSENTIAL HYPERTENSION: ICD-10-CM

## 2020-11-18 DIAGNOSIS — K21.9 GASTROESOPHAGEAL REFLUX DISEASE WITHOUT ESOPHAGITIS: ICD-10-CM

## 2020-11-18 DIAGNOSIS — K59.01 SLOW TRANSIT CONSTIPATION: ICD-10-CM

## 2020-11-18 DIAGNOSIS — R00.2 PALPITATIONS: ICD-10-CM

## 2020-11-18 DIAGNOSIS — M81.8 ADULT IDIOPATHIC GENERALIZED OSTEOPOROSIS: ICD-10-CM

## 2020-11-18 DIAGNOSIS — E78.00 PURE HYPERCHOLESTEROLEMIA: ICD-10-CM

## 2020-11-18 DIAGNOSIS — Z12.31 SCREENING MAMMOGRAM FOR HIGH-RISK PATIENT: ICD-10-CM

## 2020-11-18 DIAGNOSIS — R92.8 ABNORMAL MAMMOGRAM: Primary | ICD-10-CM

## 2020-11-18 PROCEDURE — 99214 PR OFFICE/OUTPT VISIT, EST, LEVL IV, 30-39 MIN: ICD-10-PCS | Mod: S$GLB,,, | Performed by: FAMILY MEDICINE

## 2020-11-18 PROCEDURE — 99214 OFFICE O/P EST MOD 30 MIN: CPT | Mod: S$GLB,,, | Performed by: FAMILY MEDICINE

## 2020-11-18 RX ORDER — RALOXIFENE HYDROCHLORIDE 60 MG/1
60 TABLET, FILM COATED ORAL DAILY
Qty: 90 TABLET | Refills: 1 | Status: SHIPPED | OUTPATIENT
Start: 2020-11-18 | End: 2021-05-19 | Stop reason: SDUPTHER

## 2020-11-18 RX ORDER — AMLODIPINE BESYLATE 5 MG/1
5 TABLET ORAL DAILY
Qty: 90 TABLET | Refills: 1 | Status: SHIPPED | OUTPATIENT
Start: 2020-11-18 | End: 2021-05-19 | Stop reason: SDUPTHER

## 2020-11-18 RX ORDER — LEVOTHYROXINE SODIUM 50 UG/1
25 TABLET ORAL DAILY
Qty: 45 TABLET | Refills: 1 | Status: CANCELLED | OUTPATIENT
Start: 2020-11-18 | End: 2021-05-17

## 2020-11-18 RX ORDER — OMEPRAZOLE 40 MG/1
40 CAPSULE, DELAYED RELEASE ORAL DAILY
Qty: 90 CAPSULE | Refills: 1 | Status: SHIPPED | OUTPATIENT
Start: 2020-11-18 | End: 2021-05-19 | Stop reason: SDUPTHER

## 2020-11-18 RX ORDER — ROSUVASTATIN CALCIUM 5 MG/1
5 TABLET, COATED ORAL DAILY
Qty: 90 TABLET | Refills: 1 | Status: SHIPPED | OUTPATIENT
Start: 2020-11-18 | End: 2021-05-19 | Stop reason: SDUPTHER

## 2020-11-18 NOTE — PROGRESS NOTES
SUBJECTIVE:    Patient ID: Sophia Alberto is a 70 y.o. female.    Chief Complaint: Follow-up (brought bottles // Colonoscopy - requested, states Dr. Avilez says she no longer needs // Refused dexa and flu shot // Mammogram requested from [facility] )    This 70-year-old female has a history of fibrocystic breast disease.  SHe had a mammogram in follow-up ultrasound 12 months ago.  She did not get a six-month follow-up as were recommended by the radiologist but she is ready to follow-up now.    She complains of intermittent sharp left lower quadrant pains.  She takes a daily Senokot and  stool softeners,.  She has a history of diverticulosis but no diverticulitis.  2014-colonoscopy done-RTC 10 years    She has sinus allergies and congestion but over-the-counter medications are effective.    She gave up smoking 3 years ago and switched to vaping she smokes cigarettes since age of 17 through 67.  (50 years)    She has sebaceous cysts on her back and chest and would like a dermatologist to excise them.    She has hypothyroidism and is taking 25 mg levothyroxine daily      Office Visit on 05/20/2020   Component Date Value Ref Range Status    WBC 11/05/2020 7.9  3.8 - 10.8 Thousand/uL Final    RBC 11/05/2020 4.64  3.80 - 5.10 Million/uL Final    Hemoglobin 11/05/2020 14.4  11.7 - 15.5 g/dL Final    Hematocrit 11/05/2020 43.5  35.0 - 45.0 % Final    MCV 11/05/2020 93.8  80.0 - 100.0 fL Final    MCH 11/05/2020 31.0  27.0 - 33.0 pg Final    MCHC 11/05/2020 33.1  32.0 - 36.0 g/dL Final    RDW 11/05/2020 12.9  11.0 - 15.0 % Final    Platelets 11/05/2020 226  140 - 400 Thousand/uL Final    MPV 11/05/2020 10.4  7.5 - 12.5 fL Final    Neutrophils Absolute 11/05/2020 5,214  1,500 - 7,800 cells/uL Final    Lymph # 11/05/2020 1,904  850 - 3,900 cells/uL Final    Mono # 11/05/2020 624  200 - 950 cells/uL Final    Eos # 11/05/2020 111  15 - 500 cells/uL Final    Baso # 11/05/2020 47  0 - 200 cells/uL Final     Neutrophils Relative 11/05/2020 66  % Final    Lymph % 11/05/2020 24.1  % Final    Mono % 11/05/2020 7.9  % Final    Eosinophil % 11/05/2020 1.4  % Final    Basophil % 11/05/2020 0.6  % Final    Glucose 11/05/2020 113* 65 - 99 mg/dL Final    BUN 11/05/2020 15  7 - 25 mg/dL Final    Creatinine 11/05/2020 0.66  0.60 - 0.93 mg/dL Final    eGFR if non African American 11/05/2020 89  > OR = 60 mL/min/1.73m2 Final    eGFR if African American 11/05/2020 104  > OR = 60 mL/min/1.73m2 Final    BUN/Creatinine Ratio 11/05/2020 NOT APPLICABLE  6 - 22 (calc) Final    Sodium 11/05/2020 143  135 - 146 mmol/L Final    Potassium 11/05/2020 4.5  3.5 - 5.3 mmol/L Final    Chloride 11/05/2020 107  98 - 110 mmol/L Final    CO2 11/05/2020 32  20 - 32 mmol/L Final    Calcium 11/05/2020 8.9  8.6 - 10.4 mg/dL Final    Total Protein 11/05/2020 6.3  6.1 - 8.1 g/dL Final    Albumin 11/05/2020 4.2  3.6 - 5.1 g/dL Final    Globulin, Total 11/05/2020 2.1  1.9 - 3.7 g/dL (calc) Final    Albumin/Globulin Ratio 11/05/2020 2.0  1.0 - 2.5 (calc) Final    Total Bilirubin 11/05/2020 0.6  0.2 - 1.2 mg/dL Final    Alkaline Phosphatase 11/05/2020 66  37 - 153 U/L Final    AST 11/05/2020 17  10 - 35 U/L Final    ALT 11/05/2020 17  6 - 29 U/L Final    Cholesterol 11/05/2020 143  <200 mg/dL Final    HDL 11/05/2020 63  > OR = 50 mg/dL Final    Triglycerides 11/05/2020 119  <150 mg/dL Final    LDL Cholesterol 11/05/2020 59  mg/dL (calc) Final    HDL/Cholesterol Ratio 11/05/2020 2.3  <5.0 (calc) Final    Non HDL Chol. (LDL+VLDL) 11/05/2020 80  <130 mg/dL (calc) Final    TSH w/reflex to FT4 11/05/2020 2.18  0.40 - 4.50 mIU/L Final       Past Medical History:   Diagnosis Date    History of nuclear stress test 2015    Normal    Hyperlipidemia     Hypertension     Mitral valve prolapse      Social History     Socioeconomic History    Marital status:      Spouse name: Not on file    Number of children: Not on file    Years  of education: Not on file    Highest education level: Not on file   Occupational History    Not on file   Social Needs    Financial resource strain: Not on file    Food insecurity     Worry: Not on file     Inability: Not on file    Transportation needs     Medical: Not on file     Non-medical: Not on file   Tobacco Use    Smoking status: Current Every Day Smoker     Types: Vaping with nicotine    Smokeless tobacco: Never Used   Substance and Sexual Activity    Alcohol use: Not on file    Drug use: Never    Sexual activity: Not on file   Lifestyle    Physical activity     Days per week: Not on file     Minutes per session: Not on file    Stress: Not on file   Relationships    Social connections     Talks on phone: Not on file     Gets together: Not on file     Attends Jewish service: Not on file     Active member of club or organization: Not on file     Attends meetings of clubs or organizations: Not on file     Relationship status: Not on file   Other Topics Concern    Not on file   Social History Narrative    Not on file     Past Surgical History:   Procedure Laterality Date    CHOLECYSTECTOMY      COLONOSCOPY  2014    Dr. Avilez RTC 10 years    HYSTERECTOMY      LAPAROSCOPY       Family History   Problem Relation Age of Onset    Diabetes Mother     Heart disease Mother     Cancer Father     Breast cancer Maternal Grandmother        Review of patient's allergies indicates:   Allergen Reactions    Meperidine     Penicillins        Current Outpatient Medications:     amLODIPine (NORVASC) 5 MG tablet, Take 1 tablet (5 mg total) by mouth once daily., Disp: 90 tablet, Rfl: 1    levothyroxine (SYNTHROID) 50 MCG tablet, Take 1 tablet (50 mcg total) by mouth once daily. (Patient taking differently: Take 25 mcg by mouth once daily. ), Disp: 90 tablet, Rfl: 1    omeprazole (PRILOSEC) 40 MG capsule, Take 1 capsule (40 mg total) by mouth once daily., Disp: 90 capsule, Rfl: 1    raloxifene  "(EVISTA) 60 mg tablet, Take 1 tablet (60 mg total) by mouth once daily., Disp: 90 tablet, Rfl: 1    rosuvastatin (CRESTOR) 5 MG tablet, Take 1 tablet (5 mg total) by mouth once daily., Disp: 90 tablet, Rfl: 1    Review of Systems   Constitutional: Negative for appetite change, chills, fatigue, fever and unexpected weight change.   HENT: Negative for congestion, ear pain, sinus pain, sore throat and trouble swallowing.    Eyes: Negative for pain, discharge and visual disturbance.   Respiratory: Negative for apnea, cough, shortness of breath and wheezing.    Cardiovascular: Negative for chest pain, palpitations and leg swelling.   Gastrointestinal: Negative for abdominal pain, blood in stool, constipation (nightly senokot and  stool softeners), diarrhea, nausea and vomiting.   Endocrine: Negative for heat intolerance, polydipsia and polyuria.   Genitourinary: Positive for frequency. Negative for difficulty urinating, dyspareunia, dysuria, hematuria and menstrual problem.        Nocturia 1-2 x  nite   Musculoskeletal: Negative for arthralgias, back pain, gait problem, joint swelling and myalgias.        Rt knee aches , occas  Ibuprofen or  Tylenol.   Allergic/Immunologic: Negative for environmental allergies, food allergies and immunocompromised state.   Neurological: Negative for dizziness, tremors, seizures, numbness and headaches.   Psychiatric/Behavioral: Negative for behavioral problems, confusion, hallucinations and suicidal ideas. The patient is not nervous/anxious.           Objective:      Vitals:    11/18/20 1059 11/18/20 1954   BP: 128/72    Pulse: 72    SpO2:  96%   Weight: 55.3 kg (122 lb)    Height: 4' 11" (1.499 m)      Physical Exam  Vitals signs and nursing note reviewed.   Constitutional:       Appearance: She is well-developed.   HENT:      Head: Normocephalic and atraumatic.      Right Ear: External ear normal.      Left Ear: External ear normal.      Nose: Nose normal.   Eyes:      Pupils: Pupils " are equal, round, and reactive to light.   Neck:      Musculoskeletal: Normal range of motion and neck supple.      Thyroid: No thyromegaly.      Vascular: No carotid bruit.   Cardiovascular:      Rate and Rhythm: Normal rate and regular rhythm.      Heart sounds: Normal heart sounds. No murmur.   Pulmonary:      Effort: Pulmonary effort is normal.      Breath sounds: Normal breath sounds. No wheezing or rales.   Abdominal:      General: Bowel sounds are normal. There is no distension.      Palpations: Abdomen is soft.      Tenderness: There is no abdominal tenderness.   Musculoskeletal: Normal range of motion.         General: No tenderness or deformity.      Lumbar back: Normal. She exhibits no pain and no spasm.      Comments: Bends 90 degrees at  waist shoulders and knees have full range of motion without crepitance.  No pitting edema to lower extremities.   Lymphadenopathy:      Cervical: No cervical adenopathy.   Skin:     General: Skin is warm and dry.      Findings: No rash.      Comments: 2 x 3 cm sebaceous cyst on her lower back.  She also small sebaceous cyst on her right chest.   Neurological:      Mental Status: She is alert and oriented to person, place, and time.      Cranial Nerves: No cranial nerve deficit.      Coordination: Coordination normal.   Psychiatric:         Behavior: Behavior normal.         Thought Content: Thought content normal.         Judgment: Judgment normal.           Assessment:       1. Abnormal mammogram    2. Pure hypercholesterolemia    3. Adult idiopathic generalized osteoporosis    4. Gastroesophageal reflux disease without esophagitis    5. Acquired hypothyroidism    6. Essential hypertension    7. Screening mammogram for high-risk patient    8. Sebaceous cyst    9. MVP (mitral valve prolapse)    10. Palpitations    11. Slow transit constipation    12. Cigarette nicotine dependence without complication         Plan:       Abnormal mammogram  -     Mammo Digital Diagnostic  Bilat; Future; Expected date: 11/18/2020  -     US Breast Right Limited; Future; Expected date: 11/18/2020  She agrees to get her 12 month mammogram and breast ultrasound done.  Pure hypercholesterolemia  -     rosuvastatin (CRESTOR) 5 MG tablet; Take 1 tablet (5 mg total) by mouth once daily.  Dispense: 90 tablet; Refill: 1  Cholesterol 143 triglycerides 119, excellent lipid panel.  Adult idiopathic generalized osteoporosis  -     raloxifene (EVISTA) 60 mg tablet; Take 1 tablet (60 mg total) by mouth once daily.  Dispense: 90 tablet; Refill: 1  Continue EVista  Gastroesophageal reflux disease without esophagitis  -     omeprazole (PRILOSEC) 40 MG capsule; Take 1 capsule (40 mg total) by mouth once daily.  Dispense: 90 capsule; Refill: 1  Continue daily omeprazole as needed  Acquired hypothyroidism  -     TSH w/reflex to FT4; Future; Expected date: 11/18/2020  TSH at goal  Essential hypertension  -     amLODIPine (NORVASC) 5 MG tablet; Take 1 tablet (5 mg total) by mouth once daily.  Dispense: 90 tablet; Refill: 1  -     Comprehensive Metabolic Panel; Future; Expected date: 11/18/2020  -     Lipid Panel; Future; Expected date: 11/18/2020  Blood pressure well controlled  Screening mammogram for high-risk patient    Sebaceous cyst  Refer to  Dermatology for excision  MVP (mitral valve prolapse)    Palpitations    Slow transit constipation  Continue Senokot and stool softeners, add IBGard for IBS symptoms  Cigarette nicotine dependence without complication  She is currently vaping at this time.  Encouraged her to reduce this as quick as she can.    Follow up in about 6 months (around 5/18/2021).        11/18/2020 Deacon Davies

## 2020-12-29 ENCOUNTER — TELEPHONE (OUTPATIENT)
Dept: FAMILY MEDICINE | Facility: CLINIC | Age: 70
End: 2020-12-29

## 2020-12-29 NOTE — TELEPHONE ENCOUNTER
Call patient.  Her December mammogram was read as benign nodules by the radiologist.  He recommends repeat mammogram again in 6 months at [facility] to document stability of the nodules.

## 2021-03-01 DIAGNOSIS — E03.9 ACQUIRED HYPOTHYROIDISM: ICD-10-CM

## 2021-03-01 RX ORDER — LEVOTHYROXINE SODIUM 50 UG/1
25 TABLET ORAL DAILY
Qty: 45 TABLET | Refills: 1 | Status: SHIPPED | OUTPATIENT
Start: 2021-03-01 | End: 2021-05-19

## 2021-05-04 ENCOUNTER — TELEPHONE (OUTPATIENT)
Dept: FAMILY MEDICINE | Facility: CLINIC | Age: 71
End: 2021-05-04

## 2021-05-04 DIAGNOSIS — Z79.899 ENCOUNTER FOR LONG-TERM (CURRENT) USE OF OTHER MEDICATIONS: ICD-10-CM

## 2021-05-04 DIAGNOSIS — E78.00 PURE HYPERCHOLESTEROLEMIA: ICD-10-CM

## 2021-05-04 DIAGNOSIS — E03.9 ACQUIRED HYPOTHYROIDISM: ICD-10-CM

## 2021-05-04 DIAGNOSIS — I10 ESSENTIAL HYPERTENSION: ICD-10-CM

## 2021-05-04 DIAGNOSIS — Z00.00 ROUTINE GENERAL MEDICAL EXAMINATION AT A HEALTH CARE FACILITY: Primary | ICD-10-CM

## 2021-05-07 LAB
ALBUMIN SERPL-MCNC: 4.3 G/DL (ref 3.6–5.1)
ALBUMIN/CREAT UR: 4 MCG/MG CREAT
ALBUMIN/GLOB SERPL: 1.8 (CALC) (ref 1–2.5)
ALP SERPL-CCNC: 72 U/L (ref 37–153)
ALT SERPL-CCNC: 17 U/L (ref 6–29)
APPEARANCE UR: CLEAR
AST SERPL-CCNC: 17 U/L (ref 10–35)
BACTERIA #/AREA URNS HPF: ABNORMAL /HPF
BACTERIA UR CULT: ABNORMAL
BASOPHILS # BLD AUTO: 54 CELLS/UL (ref 0–200)
BASOPHILS NFR BLD AUTO: 0.7 %
BILIRUB SERPL-MCNC: 0.6 MG/DL (ref 0.2–1.2)
BILIRUB UR QL STRIP: NEGATIVE
BUN SERPL-MCNC: 14 MG/DL (ref 7–25)
BUN/CREAT SERPL: ABNORMAL (CALC) (ref 6–22)
CALCIUM SERPL-MCNC: 9.1 MG/DL (ref 8.6–10.4)
CHLORIDE SERPL-SCNC: 105 MMOL/L (ref 98–110)
CHOLEST SERPL-MCNC: 155 MG/DL
CHOLEST/HDLC SERPL: 2.1 (CALC)
CO2 SERPL-SCNC: 29 MMOL/L (ref 20–32)
COLOR UR: YELLOW
CREAT SERPL-MCNC: 0.67 MG/DL (ref 0.6–0.93)
CREAT UR-MCNC: 53 MG/DL (ref 20–275)
EOSINOPHIL # BLD AUTO: 123 CELLS/UL (ref 15–500)
EOSINOPHIL NFR BLD AUTO: 1.6 %
ERYTHROCYTE [DISTWIDTH] IN BLOOD BY AUTOMATED COUNT: 12.8 % (ref 11–15)
GLOBULIN SER CALC-MCNC: 2.4 G/DL (CALC) (ref 1.9–3.7)
GLUCOSE SERPL-MCNC: 119 MG/DL (ref 65–99)
GLUCOSE UR QL STRIP: NEGATIVE
HCT VFR BLD AUTO: 45.1 % (ref 35–45)
HDLC SERPL-MCNC: 73 MG/DL
HGB BLD-MCNC: 14.7 G/DL (ref 11.7–15.5)
HGB UR QL STRIP: NEGATIVE
HYALINE CASTS #/AREA URNS LPF: ABNORMAL /LPF
KETONES UR QL STRIP: NEGATIVE
LDLC SERPL CALC-MCNC: 65 MG/DL (CALC)
LEUKOCYTE ESTERASE UR QL STRIP: NEGATIVE
LYMPHOCYTES # BLD AUTO: 2456 CELLS/UL (ref 850–3900)
LYMPHOCYTES NFR BLD AUTO: 31.9 %
MCH RBC QN AUTO: 30.4 PG (ref 27–33)
MCHC RBC AUTO-ENTMCNC: 32.6 G/DL (ref 32–36)
MCV RBC AUTO: 93.2 FL (ref 80–100)
MICROALBUMIN UR-MCNC: 0.2 MG/DL
MONOCYTES # BLD AUTO: 531 CELLS/UL (ref 200–950)
MONOCYTES NFR BLD AUTO: 6.9 %
NEUTROPHILS # BLD AUTO: 4535 CELLS/UL (ref 1500–7800)
NEUTROPHILS NFR BLD AUTO: 58.9 %
NITRITE UR QL STRIP: POSITIVE
NONHDLC SERPL-MCNC: 82 MG/DL (CALC)
PH UR STRIP: 7 [PH] (ref 5–8)
PLATELET # BLD AUTO: 250 THOUSAND/UL (ref 140–400)
PMV BLD REES-ECKER: 10.6 FL (ref 7.5–12.5)
POTASSIUM SERPL-SCNC: 3.9 MMOL/L (ref 3.5–5.3)
PROT SERPL-MCNC: 6.7 G/DL (ref 6.1–8.1)
PROT UR QL STRIP: NEGATIVE
RBC # BLD AUTO: 4.84 MILLION/UL (ref 3.8–5.1)
RBC #/AREA URNS HPF: ABNORMAL /HPF
SODIUM SERPL-SCNC: 141 MMOL/L (ref 135–146)
SP GR UR STRIP: 1.01 (ref 1–1.03)
SQUAMOUS #/AREA URNS HPF: ABNORMAL /HPF
TRIGL SERPL-MCNC: 91 MG/DL
TSH SERPL-ACNC: 2.23 MIU/L (ref 0.4–4.5)
WBC # BLD AUTO: 7.7 THOUSAND/UL (ref 3.8–10.8)
WBC #/AREA URNS HPF: ABNORMAL /HPF

## 2021-05-10 ENCOUNTER — TELEPHONE (OUTPATIENT)
Dept: FAMILY MEDICINE | Facility: CLINIC | Age: 71
End: 2021-05-10

## 2021-05-19 ENCOUNTER — OFFICE VISIT (OUTPATIENT)
Dept: FAMILY MEDICINE | Facility: CLINIC | Age: 71
End: 2021-05-19
Payer: MEDICARE

## 2021-05-19 VITALS
SYSTOLIC BLOOD PRESSURE: 110 MMHG | DIASTOLIC BLOOD PRESSURE: 72 MMHG | HEIGHT: 59 IN | BODY MASS INDEX: 23.59 KG/M2 | HEART RATE: 80 BPM | WEIGHT: 117 LBS

## 2021-05-19 DIAGNOSIS — R92.2 INCONCLUSIVE MAMMOGRAM: ICD-10-CM

## 2021-05-19 DIAGNOSIS — E78.00 PURE HYPERCHOLESTEROLEMIA: ICD-10-CM

## 2021-05-19 DIAGNOSIS — F17.200 SMOKER: ICD-10-CM

## 2021-05-19 DIAGNOSIS — Z12.31 OTHER SCREENING MAMMOGRAM: ICD-10-CM

## 2021-05-19 DIAGNOSIS — Z12.2 ENCOUNTER FOR SCREENING FOR MALIGNANT NEOPLASM OF RESPIRATORY ORGANS: ICD-10-CM

## 2021-05-19 DIAGNOSIS — I34.1 MVP (MITRAL VALVE PROLAPSE): ICD-10-CM

## 2021-05-19 DIAGNOSIS — N63.0 BREAST NODULE: ICD-10-CM

## 2021-05-19 DIAGNOSIS — F17.210 CIGARETTE NICOTINE DEPENDENCE WITHOUT COMPLICATION: ICD-10-CM

## 2021-05-19 DIAGNOSIS — K59.01 SLOW TRANSIT CONSTIPATION: ICD-10-CM

## 2021-05-19 DIAGNOSIS — I10 ESSENTIAL HYPERTENSION: Primary | ICD-10-CM

## 2021-05-19 DIAGNOSIS — K21.9 GASTROESOPHAGEAL REFLUX DISEASE WITHOUT ESOPHAGITIS: ICD-10-CM

## 2021-05-19 DIAGNOSIS — Z87.891 PERSONAL HISTORY OF NICOTINE DEPENDENCE: ICD-10-CM

## 2021-05-19 DIAGNOSIS — E03.9 ACQUIRED HYPOTHYROIDISM: ICD-10-CM

## 2021-05-19 DIAGNOSIS — M81.8 ADULT IDIOPATHIC GENERALIZED OSTEOPOROSIS: ICD-10-CM

## 2021-05-19 PROCEDURE — 99214 OFFICE O/P EST MOD 30 MIN: CPT | Mod: S$GLB,,, | Performed by: FAMILY MEDICINE

## 2021-05-19 PROCEDURE — 99214 PR OFFICE/OUTPT VISIT, EST, LEVL IV, 30-39 MIN: ICD-10-PCS | Mod: S$GLB,,, | Performed by: FAMILY MEDICINE

## 2021-05-19 RX ORDER — AMLODIPINE BESYLATE 5 MG/1
5 TABLET ORAL DAILY
Qty: 90 TABLET | Refills: 1 | Status: SHIPPED | OUTPATIENT
Start: 2021-05-19 | End: 2021-11-23 | Stop reason: SDUPTHER

## 2021-05-19 RX ORDER — LEVOTHYROXINE SODIUM 50 UG/1
25 TABLET ORAL DAILY
Qty: 45 TABLET | Refills: 1 | Status: CANCELLED | OUTPATIENT
Start: 2021-05-19 | End: 2021-11-15

## 2021-05-19 RX ORDER — OMEPRAZOLE 40 MG/1
40 CAPSULE, DELAYED RELEASE ORAL DAILY
Qty: 90 CAPSULE | Refills: 1 | Status: SHIPPED | OUTPATIENT
Start: 2021-05-19 | End: 2021-11-23 | Stop reason: SDUPTHER

## 2021-05-19 RX ORDER — ROSUVASTATIN CALCIUM 5 MG/1
5 TABLET, COATED ORAL DAILY
Qty: 90 TABLET | Refills: 1 | Status: SHIPPED | OUTPATIENT
Start: 2021-05-19 | End: 2021-11-23 | Stop reason: SDUPTHER

## 2021-05-19 RX ORDER — LEVOTHYROXINE SODIUM 25 UG/1
25 TABLET ORAL
Qty: 90 TABLET | Refills: 1 | Status: SHIPPED | OUTPATIENT
Start: 2021-05-19 | End: 2021-11-23 | Stop reason: SDUPTHER

## 2021-05-19 RX ORDER — RALOXIFENE HYDROCHLORIDE 60 MG/1
60 TABLET, FILM COATED ORAL DAILY
Qty: 90 TABLET | Refills: 1 | Status: SHIPPED | OUTPATIENT
Start: 2021-05-19 | End: 2021-11-23 | Stop reason: SDUPTHER

## 2021-05-25 ENCOUNTER — TELEPHONE (OUTPATIENT)
Dept: FAMILY MEDICINE | Facility: CLINIC | Age: 71
End: 2021-05-25

## 2021-06-09 ENCOUNTER — TELEPHONE (OUTPATIENT)
Dept: FAMILY MEDICINE | Facility: CLINIC | Age: 71
End: 2021-06-09

## 2021-06-14 ENCOUNTER — TELEPHONE (OUTPATIENT)
Dept: FAMILY MEDICINE | Facility: CLINIC | Age: 71
End: 2021-06-14

## 2021-08-06 ENCOUNTER — OFFICE VISIT (OUTPATIENT)
Dept: FAMILY MEDICINE | Facility: CLINIC | Age: 71
End: 2021-08-06
Payer: MEDICARE

## 2021-08-06 ENCOUNTER — TELEPHONE (OUTPATIENT)
Dept: FAMILY MEDICINE | Facility: CLINIC | Age: 71
End: 2021-08-06

## 2021-08-06 VITALS
SYSTOLIC BLOOD PRESSURE: 140 MMHG | HEIGHT: 59 IN | TEMPERATURE: 98 F | DIASTOLIC BLOOD PRESSURE: 70 MMHG | WEIGHT: 124 LBS | BODY MASS INDEX: 25 KG/M2 | HEART RATE: 96 BPM

## 2021-08-06 DIAGNOSIS — G44.209 ACUTE NON INTRACTABLE TENSION-TYPE HEADACHE: ICD-10-CM

## 2021-08-06 DIAGNOSIS — Z20.822 ENCOUNTER FOR LABORATORY TESTING FOR COVID-19 VIRUS: ICD-10-CM

## 2021-08-06 DIAGNOSIS — J01.40 ACUTE NON-RECURRENT PANSINUSITIS: Primary | ICD-10-CM

## 2021-08-06 DIAGNOSIS — R05.9 COUGH: ICD-10-CM

## 2021-08-06 DIAGNOSIS — I10 ESSENTIAL HYPERTENSION: ICD-10-CM

## 2021-08-06 PROCEDURE — U0005 INFEC AGEN DETEC AMPLI PROBE: HCPCS | Performed by: FAMILY MEDICINE

## 2021-08-06 PROCEDURE — 99214 PR OFFICE/OUTPT VISIT, EST, LEVL IV, 30-39 MIN: ICD-10-PCS | Mod: S$GLB,,, | Performed by: FAMILY MEDICINE

## 2021-08-06 PROCEDURE — 99214 OFFICE O/P EST MOD 30 MIN: CPT | Mod: S$GLB,,, | Performed by: FAMILY MEDICINE

## 2021-08-06 PROCEDURE — U0003 INFECTIOUS AGENT DETECTION BY NUCLEIC ACID (DNA OR RNA); SEVERE ACUTE RESPIRATORY SYNDROME CORONAVIRUS 2 (SARS-COV-2) (CORONAVIRUS DISEASE [COVID-19]), AMPLIFIED PROBE TECHNIQUE, MAKING USE OF HIGH THROUGHPUT TECHNOLOGIES AS DESCRIBED BY CMS-2020-01-R: HCPCS | Performed by: FAMILY MEDICINE

## 2021-08-06 RX ORDER — ASPIRIN 81 MG/1
81 TABLET ORAL DAILY
COMMUNITY

## 2021-08-06 RX ORDER — DOXYCYCLINE 100 MG/1
100 CAPSULE ORAL 2 TIMES DAILY
Qty: 20 CAPSULE | Refills: 0 | Status: SHIPPED | OUTPATIENT
Start: 2021-08-06 | End: 2021-12-15

## 2021-08-07 LAB
SARS-COV-2 RNA RESP QL NAA+PROBE: NOT DETECTED
SARS-COV-2- CYCLE NUMBER: -1

## 2021-08-09 ENCOUNTER — TELEPHONE (OUTPATIENT)
Dept: FAMILY MEDICINE | Facility: CLINIC | Age: 71
End: 2021-08-09

## 2021-08-24 ENCOUNTER — TELEPHONE (OUTPATIENT)
Dept: FAMILY MEDICINE | Facility: CLINIC | Age: 71
End: 2021-08-24

## 2021-08-24 DIAGNOSIS — J02.9 SORE THROAT: Primary | ICD-10-CM

## 2021-08-25 ENCOUNTER — TELEPHONE (OUTPATIENT)
Dept: FAMILY MEDICINE | Facility: CLINIC | Age: 71
End: 2021-08-25

## 2021-08-27 ENCOUNTER — TELEPHONE (OUTPATIENT)
Dept: FAMILY MEDICINE | Facility: CLINIC | Age: 71
End: 2021-08-27

## 2021-09-08 ENCOUNTER — TELEPHONE (OUTPATIENT)
Dept: FAMILY MEDICINE | Facility: CLINIC | Age: 71
End: 2021-09-08

## 2021-11-10 ENCOUNTER — TELEPHONE (OUTPATIENT)
Dept: FAMILY MEDICINE | Facility: CLINIC | Age: 71
End: 2021-11-10
Payer: MEDICARE

## 2021-11-10 DIAGNOSIS — E78.00 PURE HYPERCHOLESTEROLEMIA: ICD-10-CM

## 2021-11-10 DIAGNOSIS — I10 ESSENTIAL HYPERTENSION: ICD-10-CM

## 2021-11-10 DIAGNOSIS — Z79.899 ENCOUNTER FOR LONG-TERM (CURRENT) USE OF OTHER MEDICATIONS: Primary | ICD-10-CM

## 2021-11-17 LAB
ALBUMIN SERPL-MCNC: 4.1 G/DL (ref 3.6–5.1)
ALBUMIN/GLOB SERPL: 1.8 (CALC) (ref 1–2.5)
ALP SERPL-CCNC: 82 U/L (ref 37–153)
ALT SERPL-CCNC: 17 U/L (ref 6–29)
AST SERPL-CCNC: 18 U/L (ref 10–35)
BILIRUB SERPL-MCNC: 0.5 MG/DL (ref 0.2–1.2)
BUN SERPL-MCNC: 15 MG/DL (ref 7–25)
BUN/CREAT SERPL: ABNORMAL (CALC) (ref 6–22)
CALCIUM SERPL-MCNC: 8.8 MG/DL (ref 8.6–10.4)
CHLORIDE SERPL-SCNC: 104 MMOL/L (ref 98–110)
CHOLEST SERPL-MCNC: 167 MG/DL
CHOLEST/HDLC SERPL: 2.5 (CALC)
CO2 SERPL-SCNC: 30 MMOL/L (ref 20–32)
CREAT SERPL-MCNC: 0.73 MG/DL (ref 0.6–0.93)
GLOBULIN SER CALC-MCNC: 2.3 G/DL (CALC) (ref 1.9–3.7)
GLUCOSE SERPL-MCNC: 118 MG/DL (ref 65–99)
HDLC SERPL-MCNC: 68 MG/DL
LDLC SERPL CALC-MCNC: 77 MG/DL (CALC)
NONHDLC SERPL-MCNC: 99 MG/DL (CALC)
POTASSIUM SERPL-SCNC: 4.1 MMOL/L (ref 3.5–5.3)
PROT SERPL-MCNC: 6.4 G/DL (ref 6.1–8.1)
SODIUM SERPL-SCNC: 142 MMOL/L (ref 135–146)
TRIGL SERPL-MCNC: 134 MG/DL

## 2021-11-23 ENCOUNTER — OFFICE VISIT (OUTPATIENT)
Dept: FAMILY MEDICINE | Facility: CLINIC | Age: 71
End: 2021-11-23
Payer: MEDICARE

## 2021-11-23 ENCOUNTER — TELEPHONE (OUTPATIENT)
Dept: FAMILY MEDICINE | Facility: CLINIC | Age: 71
End: 2021-11-23
Payer: MEDICARE

## 2021-11-23 VITALS
DIASTOLIC BLOOD PRESSURE: 78 MMHG | WEIGHT: 125 LBS | BODY MASS INDEX: 25.2 KG/M2 | HEIGHT: 59 IN | HEART RATE: 89 BPM | SYSTOLIC BLOOD PRESSURE: 100 MMHG

## 2021-11-23 DIAGNOSIS — E78.00 PURE HYPERCHOLESTEROLEMIA: ICD-10-CM

## 2021-11-23 DIAGNOSIS — K57.30 DIVERTICULOSIS OF LARGE INTESTINE WITHOUT PERFORATION OR ABSCESS WITHOUT BLEEDING: ICD-10-CM

## 2021-11-23 DIAGNOSIS — M81.8 ADULT IDIOPATHIC GENERALIZED OSTEOPOROSIS: ICD-10-CM

## 2021-11-23 DIAGNOSIS — K59.01 SLOW TRANSIT CONSTIPATION: ICD-10-CM

## 2021-11-23 DIAGNOSIS — I10 ESSENTIAL HYPERTENSION: Primary | ICD-10-CM

## 2021-11-23 DIAGNOSIS — E03.9 ACQUIRED HYPOTHYROIDISM: ICD-10-CM

## 2021-11-23 DIAGNOSIS — I10 PRIMARY HYPERTENSION: ICD-10-CM

## 2021-11-23 DIAGNOSIS — F17.210 CIGARETTE NICOTINE DEPENDENCE WITHOUT COMPLICATION: ICD-10-CM

## 2021-11-23 DIAGNOSIS — K21.9 GASTROESOPHAGEAL REFLUX DISEASE WITHOUT ESOPHAGITIS: ICD-10-CM

## 2021-11-23 DIAGNOSIS — M72.2 PLANTAR FASCIITIS OF LEFT FOOT: ICD-10-CM

## 2021-11-23 PROCEDURE — 99214 PR OFFICE/OUTPT VISIT, EST, LEVL IV, 30-39 MIN: ICD-10-PCS | Mod: S$GLB,,, | Performed by: FAMILY MEDICINE

## 2021-11-23 PROCEDURE — 99214 OFFICE O/P EST MOD 30 MIN: CPT | Mod: S$GLB,,, | Performed by: FAMILY MEDICINE

## 2021-11-23 RX ORDER — RALOXIFENE HYDROCHLORIDE 60 MG/1
60 TABLET, FILM COATED ORAL DAILY
Qty: 90 TABLET | Refills: 1 | Status: SHIPPED | OUTPATIENT
Start: 2021-11-23 | End: 2022-05-31 | Stop reason: SDUPTHER

## 2021-11-23 RX ORDER — OMEPRAZOLE 40 MG/1
40 CAPSULE, DELAYED RELEASE ORAL DAILY
Qty: 90 CAPSULE | Refills: 1 | Status: SHIPPED | OUTPATIENT
Start: 2021-11-23 | End: 2022-05-31 | Stop reason: SDUPTHER

## 2021-11-23 RX ORDER — LEVOTHYROXINE SODIUM 25 UG/1
25 TABLET ORAL
Qty: 90 TABLET | Refills: 1 | Status: SHIPPED | OUTPATIENT
Start: 2021-11-23 | End: 2022-05-31 | Stop reason: SDUPTHER

## 2021-11-23 RX ORDER — AMLODIPINE BESYLATE 5 MG/1
5 TABLET ORAL DAILY
Qty: 90 TABLET | Refills: 1 | Status: SHIPPED | OUTPATIENT
Start: 2021-11-23 | End: 2022-05-31 | Stop reason: SDUPTHER

## 2021-11-23 RX ORDER — ROSUVASTATIN CALCIUM 5 MG/1
5 TABLET, COATED ORAL DAILY
Qty: 90 TABLET | Refills: 1 | Status: SHIPPED | OUTPATIENT
Start: 2021-11-23 | End: 2022-05-31 | Stop reason: SDUPTHER

## 2021-11-24 PROBLEM — M72.2 PLANTAR FASCIITIS OF LEFT FOOT: Status: ACTIVE | Noted: 2021-11-24

## 2021-12-14 ENCOUNTER — TELEPHONE (OUTPATIENT)
Dept: FAMILY MEDICINE | Facility: CLINIC | Age: 71
End: 2021-12-14
Payer: MEDICARE

## 2021-12-15 ENCOUNTER — OFFICE VISIT (OUTPATIENT)
Dept: FAMILY MEDICINE | Facility: CLINIC | Age: 71
End: 2021-12-15
Payer: MEDICARE

## 2021-12-15 VITALS
DIASTOLIC BLOOD PRESSURE: 82 MMHG | SYSTOLIC BLOOD PRESSURE: 118 MMHG | HEART RATE: 76 BPM | WEIGHT: 125.19 LBS | HEIGHT: 59 IN | BODY MASS INDEX: 25.24 KG/M2

## 2021-12-15 DIAGNOSIS — R39.9 UTI SYMPTOMS: ICD-10-CM

## 2021-12-15 DIAGNOSIS — K57.92 ACUTE DIVERTICULITIS: Primary | ICD-10-CM

## 2021-12-15 PROCEDURE — 81003 URINALYSIS AUTO W/O SCOPE: CPT | Mod: QW,S$GLB,, | Performed by: NURSE PRACTITIONER

## 2021-12-15 PROCEDURE — 81003 POCT URINALYSIS, DIPSTICK, AUTOMATED, W/O SCOPE: ICD-10-PCS | Mod: QW,S$GLB,, | Performed by: NURSE PRACTITIONER

## 2021-12-15 PROCEDURE — 99213 PR OFFICE/OUTPT VISIT, EST, LEVL III, 20-29 MIN: ICD-10-PCS | Mod: S$GLB,,, | Performed by: NURSE PRACTITIONER

## 2021-12-15 PROCEDURE — 99213 OFFICE O/P EST LOW 20 MIN: CPT | Mod: S$GLB,,, | Performed by: NURSE PRACTITIONER

## 2021-12-15 RX ORDER — CIPROFLOXACIN 500 MG/1
500 TABLET ORAL 2 TIMES DAILY
Qty: 20 TABLET | Refills: 0 | Status: SHIPPED | OUTPATIENT
Start: 2021-12-15 | End: 2023-07-31

## 2021-12-15 RX ORDER — METRONIDAZOLE 500 MG/1
500 TABLET ORAL 3 TIMES DAILY
Qty: 30 TABLET | Refills: 0 | Status: SHIPPED | OUTPATIENT
Start: 2021-12-15 | End: 2023-07-31

## 2021-12-17 ENCOUNTER — HOSPITAL ENCOUNTER (EMERGENCY)
Facility: HOSPITAL | Age: 71
Discharge: HOME | End: 2021-12-17
Attending: EMERGENCY MEDICINE
Payer: MEDICARE

## 2021-12-17 ENCOUNTER — TELEPHONE (OUTPATIENT)
Dept: FAMILY MEDICINE | Facility: CLINIC | Age: 71
End: 2021-12-17
Payer: MEDICARE

## 2021-12-17 VITALS
HEART RATE: 81 BPM | HEIGHT: 59 IN | BODY MASS INDEX: 25 KG/M2 | WEIGHT: 124 LBS | SYSTOLIC BLOOD PRESSURE: 141 MMHG | RESPIRATION RATE: 18 BRPM | TEMPERATURE: 98 F | OXYGEN SATURATION: 97 % | DIASTOLIC BLOOD PRESSURE: 80 MMHG

## 2021-12-17 DIAGNOSIS — I77.1 ILIAC ARTERY STENOSIS, LEFT: Primary | ICD-10-CM

## 2021-12-17 DIAGNOSIS — K42.9 UMBILICAL HERNIA WITHOUT OBSTRUCTION AND WITHOUT GANGRENE: ICD-10-CM

## 2021-12-17 DIAGNOSIS — R10.30 LOWER ABDOMINAL PAIN: ICD-10-CM

## 2021-12-17 LAB
ALBUMIN SERPL BCP-MCNC: 4.4 G/DL (ref 3.5–5.2)
ALP SERPL-CCNC: 81 U/L (ref 55–135)
ALT SERPL W/O P-5'-P-CCNC: 26 U/L (ref 10–44)
ANION GAP SERPL CALC-SCNC: 9 MMOL/L (ref 8–16)
AST SERPL-CCNC: 25 U/L (ref 10–40)
BASOPHILS # BLD AUTO: 0.05 K/UL (ref 0–0.2)
BASOPHILS NFR BLD: 0.6 % (ref 0–1.9)
BILIRUB SERPL-MCNC: 0.8 MG/DL (ref 0.1–1)
BILIRUB UR QL STRIP: NEGATIVE
BUN SERPL-MCNC: 11 MG/DL (ref 8–23)
CALCIUM SERPL-MCNC: 9.5 MG/DL (ref 8.7–10.5)
CHLORIDE SERPL-SCNC: 105 MMOL/L (ref 95–110)
CLARITY UR: CLEAR
CO2 SERPL-SCNC: 27 MMOL/L (ref 23–29)
COLOR UR: YELLOW
CREAT SERPL-MCNC: 0.6 MG/DL (ref 0.5–1.4)
CREAT SERPL-MCNC: 0.7 MG/DL (ref 0.5–1.4)
CREAT SERPL-MCNC: 0.7 MG/DL (ref 0.5–1.4)
DIFFERENTIAL METHOD: NORMAL
EOSINOPHIL # BLD AUTO: 0 K/UL (ref 0–0.5)
EOSINOPHIL NFR BLD: 0.3 % (ref 0–8)
ERYTHROCYTE [DISTWIDTH] IN BLOOD BY AUTOMATED COUNT: 12.6 % (ref 11.5–14.5)
EST. GFR  (AFRICAN AMERICAN): >60 ML/MIN/1.73 M^2
EST. GFR  (NON AFRICAN AMERICAN): >60 ML/MIN/1.73 M^2
GLUCOSE SERPL-MCNC: 96 MG/DL (ref 70–110)
GLUCOSE UR QL STRIP: NEGATIVE
HCT VFR BLD AUTO: 45.6 % (ref 37–48.5)
HGB BLD-MCNC: 15.2 G/DL (ref 12–16)
HGB UR QL STRIP: ABNORMAL
IMM GRANULOCYTES # BLD AUTO: 0.03 K/UL (ref 0–0.04)
IMM GRANULOCYTES NFR BLD AUTO: 0.3 % (ref 0–0.5)
KETONES UR QL STRIP: ABNORMAL
LEUKOCYTE ESTERASE UR QL STRIP: NEGATIVE
LIPASE SERPL-CCNC: 28 U/L (ref 4–60)
LYMPHOCYTES # BLD AUTO: 1.9 K/UL (ref 1–4.8)
LYMPHOCYTES NFR BLD: 21.3 % (ref 18–48)
MCH RBC QN AUTO: 30.5 PG (ref 27–31)
MCHC RBC AUTO-ENTMCNC: 33.3 G/DL (ref 32–36)
MCV RBC AUTO: 91 FL (ref 82–98)
MONOCYTES # BLD AUTO: 0.7 K/UL (ref 0.3–1)
MONOCYTES NFR BLD: 8 % (ref 4–15)
NEUTROPHILS # BLD AUTO: 6.1 K/UL (ref 1.8–7.7)
NEUTROPHILS NFR BLD: 69.5 % (ref 38–73)
NITRITE UR QL STRIP: NEGATIVE
NRBC BLD-RTO: 0 /100 WBC
PH UR STRIP: 6 [PH] (ref 5–8)
PLATELET # BLD AUTO: 274 K/UL (ref 150–450)
PMV BLD AUTO: 10.1 FL (ref 9.2–12.9)
POTASSIUM SERPL-SCNC: 3.8 MMOL/L (ref 3.5–5.1)
PROT SERPL-MCNC: 7.4 G/DL (ref 6–8.4)
PROT UR QL STRIP: NEGATIVE
RBC # BLD AUTO: 4.99 M/UL (ref 4–5.4)
SAMPLE: NORMAL
SAMPLE: NORMAL
SODIUM SERPL-SCNC: 141 MMOL/L (ref 136–145)
SP GR UR STRIP: >1.03 (ref 1–1.03)
URN SPEC COLLECT METH UR: ABNORMAL
UROBILINOGEN UR STRIP-ACNC: NEGATIVE EU/DL
WBC # BLD AUTO: 8.73 K/UL (ref 3.9–12.7)

## 2021-12-17 PROCEDURE — 25500020 PHARM REV CODE 255: Performed by: NURSE PRACTITIONER

## 2021-12-17 PROCEDURE — 99285 EMERGENCY DEPT VISIT HI MDM: CPT | Mod: 25

## 2021-12-17 PROCEDURE — 81003 URINALYSIS AUTO W/O SCOPE: CPT | Performed by: NURSE PRACTITIONER

## 2021-12-17 PROCEDURE — 85025 COMPLETE CBC W/AUTO DIFF WBC: CPT | Performed by: NURSE PRACTITIONER

## 2021-12-17 PROCEDURE — 83690 ASSAY OF LIPASE: CPT | Performed by: NURSE PRACTITIONER

## 2021-12-17 PROCEDURE — 80053 COMPREHEN METABOLIC PANEL: CPT | Performed by: NURSE PRACTITIONER

## 2021-12-17 RX ADMIN — IOHEXOL 100 ML: 350 INJECTION, SOLUTION INTRAVENOUS at 01:12

## 2021-12-20 ENCOUNTER — TELEPHONE (OUTPATIENT)
Dept: FAMILY MEDICINE | Facility: CLINIC | Age: 71
End: 2021-12-20
Payer: MEDICARE

## 2021-12-20 ENCOUNTER — OFFICE VISIT (OUTPATIENT)
Dept: FAMILY MEDICINE | Facility: CLINIC | Age: 71
End: 2021-12-20
Payer: MEDICARE

## 2021-12-20 VITALS
DIASTOLIC BLOOD PRESSURE: 76 MMHG | HEART RATE: 84 BPM | WEIGHT: 121 LBS | SYSTOLIC BLOOD PRESSURE: 118 MMHG | BODY MASS INDEX: 24.39 KG/M2 | HEIGHT: 59 IN

## 2021-12-20 DIAGNOSIS — R10.32 LLQ PAIN: Primary | ICD-10-CM

## 2021-12-20 DIAGNOSIS — I77.1 STENOSIS OF ILIAC ARTERY: ICD-10-CM

## 2021-12-20 DIAGNOSIS — I10 ESSENTIAL HYPERTENSION: ICD-10-CM

## 2021-12-20 PROCEDURE — 99214 PR OFFICE/OUTPT VISIT, EST, LEVL IV, 30-39 MIN: ICD-10-PCS | Mod: S$GLB,,, | Performed by: PHYSICIAN ASSISTANT

## 2021-12-20 PROCEDURE — 99214 OFFICE O/P EST MOD 30 MIN: CPT | Mod: S$GLB,,, | Performed by: PHYSICIAN ASSISTANT

## 2022-05-16 ENCOUNTER — TELEPHONE (OUTPATIENT)
Dept: FAMILY MEDICINE | Facility: CLINIC | Age: 72
End: 2022-05-16

## 2022-05-19 ENCOUNTER — TELEPHONE (OUTPATIENT)
Dept: FAMILY MEDICINE | Facility: CLINIC | Age: 72
End: 2022-05-19

## 2022-05-19 DIAGNOSIS — Z79.899 ENCOUNTER FOR LONG-TERM (CURRENT) USE OF OTHER MEDICATIONS: Primary | ICD-10-CM

## 2022-05-19 DIAGNOSIS — I10 ESSENTIAL HYPERTENSION: ICD-10-CM

## 2022-05-19 DIAGNOSIS — E03.9 ACQUIRED HYPOTHYROIDISM: ICD-10-CM

## 2022-05-19 LAB
ALBUMIN SERPL-MCNC: 3.8 G/DL (ref 3.6–5.1)
ALBUMIN/GLOB SERPL: 1.7 (CALC) (ref 1–2.5)
ALP SERPL-CCNC: 75 U/L (ref 37–153)
ALT SERPL-CCNC: 22 U/L (ref 6–29)
APPEARANCE UR: CLEAR
AST SERPL-CCNC: 18 U/L (ref 10–35)
BACTERIA #/AREA URNS HPF: ABNORMAL /HPF
BACTERIA UR CULT: ABNORMAL
BACTERIA UR CULT: ABNORMAL
BASOPHILS # BLD AUTO: 73 CELLS/UL (ref 0–200)
BASOPHILS NFR BLD AUTO: 0.8 %
BILIRUB SERPL-MCNC: 0.5 MG/DL (ref 0.2–1.2)
BILIRUB UR QL STRIP: NEGATIVE
BUN SERPL-MCNC: 13 MG/DL (ref 7–25)
BUN/CREAT SERPL: ABNORMAL (CALC) (ref 6–22)
CALCIUM SERPL-MCNC: 8.7 MG/DL (ref 8.6–10.4)
CHLORIDE SERPL-SCNC: 105 MMOL/L (ref 98–110)
CHOLEST SERPL-MCNC: 144 MG/DL
CHOLEST/HDLC SERPL: 2.7 (CALC)
CO2 SERPL-SCNC: 27 MMOL/L (ref 20–32)
COLOR UR: YELLOW
CREAT SERPL-MCNC: 0.66 MG/DL (ref 0.6–0.93)
EOSINOPHIL # BLD AUTO: 346 CELLS/UL (ref 15–500)
EOSINOPHIL NFR BLD AUTO: 3.8 %
ERYTHROCYTE [DISTWIDTH] IN BLOOD BY AUTOMATED COUNT: 12.4 % (ref 11–15)
GLOBULIN SER CALC-MCNC: 2.3 G/DL (CALC) (ref 1.9–3.7)
GLUCOSE SERPL-MCNC: 111 MG/DL (ref 65–99)
GLUCOSE UR QL STRIP: NEGATIVE
HCT VFR BLD AUTO: 42.2 % (ref 35–45)
HDLC SERPL-MCNC: 53 MG/DL
HGB BLD-MCNC: 13.7 G/DL (ref 11.7–15.5)
HGB UR QL STRIP: NEGATIVE
HYALINE CASTS #/AREA URNS LPF: ABNORMAL /LPF
KETONES UR QL STRIP: NEGATIVE
LDLC SERPL CALC-MCNC: 68 MG/DL (CALC)
LEUKOCYTE ESTERASE UR QL STRIP: NEGATIVE
LYMPHOCYTES # BLD AUTO: 1729 CELLS/UL (ref 850–3900)
LYMPHOCYTES NFR BLD AUTO: 19 %
MCH RBC QN AUTO: 30.9 PG (ref 27–33)
MCHC RBC AUTO-ENTMCNC: 32.5 G/DL (ref 32–36)
MCV RBC AUTO: 95 FL (ref 80–100)
MONOCYTES # BLD AUTO: 819 CELLS/UL (ref 200–950)
MONOCYTES NFR BLD AUTO: 9 %
NEUTROPHILS # BLD AUTO: 6133 CELLS/UL (ref 1500–7800)
NEUTROPHILS NFR BLD AUTO: 67.4 %
NITRITE UR QL STRIP: NEGATIVE
NONHDLC SERPL-MCNC: 91 MG/DL (CALC)
PH UR STRIP: 7.5 [PH] (ref 5–8)
PLATELET # BLD AUTO: 242 THOUSAND/UL (ref 140–400)
PMV BLD REES-ECKER: 10 FL (ref 7.5–12.5)
POTASSIUM SERPL-SCNC: 4.1 MMOL/L (ref 3.5–5.3)
PROT SERPL-MCNC: 6.1 G/DL (ref 6.1–8.1)
PROT UR QL STRIP: NEGATIVE
RBC # BLD AUTO: 4.44 MILLION/UL (ref 3.8–5.1)
RBC #/AREA URNS HPF: ABNORMAL /HPF
SODIUM SERPL-SCNC: 140 MMOL/L (ref 135–146)
SP GR UR STRIP: 1.01 (ref 1–1.03)
SQUAMOUS #/AREA URNS HPF: ABNORMAL /HPF
TRIGL SERPL-MCNC: 145 MG/DL
TSH SERPL-ACNC: 1.95 MIU/L (ref 0.4–4.5)
WBC # BLD AUTO: 9.1 THOUSAND/UL (ref 3.8–10.8)
WBC #/AREA URNS HPF: ABNORMAL /HPF

## 2022-05-19 NOTE — TELEPHONE ENCOUNTER
Put in lab orders and then realized the patient just had these done, they just attached to old orders from 11/21

## 2022-05-31 ENCOUNTER — OFFICE VISIT (OUTPATIENT)
Dept: FAMILY MEDICINE | Facility: CLINIC | Age: 72
End: 2022-05-31
Payer: MEDICARE

## 2022-05-31 VITALS
DIASTOLIC BLOOD PRESSURE: 70 MMHG | HEIGHT: 59 IN | BODY MASS INDEX: 25.2 KG/M2 | HEART RATE: 92 BPM | SYSTOLIC BLOOD PRESSURE: 110 MMHG | WEIGHT: 125 LBS

## 2022-05-31 DIAGNOSIS — F17.210 CIGARETTE NICOTINE DEPENDENCE WITHOUT COMPLICATION: ICD-10-CM

## 2022-05-31 DIAGNOSIS — F17.200 SMOKER: ICD-10-CM

## 2022-05-31 DIAGNOSIS — E03.9 ACQUIRED HYPOTHYROIDISM: ICD-10-CM

## 2022-05-31 DIAGNOSIS — Z87.891 PERSONAL HISTORY OF NICOTINE DEPENDENCE: ICD-10-CM

## 2022-05-31 DIAGNOSIS — I10 PRIMARY HYPERTENSION: ICD-10-CM

## 2022-05-31 DIAGNOSIS — K59.01 SLOW TRANSIT CONSTIPATION: ICD-10-CM

## 2022-05-31 DIAGNOSIS — K57.30 DIVERTICULOSIS OF LARGE INTESTINE WITHOUT PERFORATION OR ABSCESS WITHOUT BLEEDING: ICD-10-CM

## 2022-05-31 DIAGNOSIS — K57.92 ACUTE DIVERTICULITIS: ICD-10-CM

## 2022-05-31 DIAGNOSIS — Z12.31 OTHER SCREENING MAMMOGRAM: ICD-10-CM

## 2022-05-31 DIAGNOSIS — M81.8 ADULT IDIOPATHIC GENERALIZED OSTEOPOROSIS: ICD-10-CM

## 2022-05-31 DIAGNOSIS — K21.9 GASTROESOPHAGEAL REFLUX DISEASE WITHOUT ESOPHAGITIS: ICD-10-CM

## 2022-05-31 DIAGNOSIS — E78.00 PURE HYPERCHOLESTEROLEMIA: ICD-10-CM

## 2022-05-31 DIAGNOSIS — I10 ESSENTIAL HYPERTENSION: Primary | ICD-10-CM

## 2022-05-31 DIAGNOSIS — R00.2 PALPITATIONS: ICD-10-CM

## 2022-05-31 PROCEDURE — 99214 PR OFFICE/OUTPT VISIT, EST, LEVL IV, 30-39 MIN: ICD-10-PCS | Mod: S$GLB,,, | Performed by: FAMILY MEDICINE

## 2022-05-31 PROCEDURE — 99214 OFFICE O/P EST MOD 30 MIN: CPT | Mod: S$GLB,,, | Performed by: FAMILY MEDICINE

## 2022-05-31 RX ORDER — RALOXIFENE HYDROCHLORIDE 60 MG/1
60 TABLET, FILM COATED ORAL DAILY
Qty: 90 TABLET | Refills: 1 | Status: SHIPPED | OUTPATIENT
Start: 2022-05-31 | End: 2022-11-29 | Stop reason: SDUPTHER

## 2022-05-31 RX ORDER — AMLODIPINE BESYLATE 5 MG/1
5 TABLET ORAL DAILY
Qty: 90 TABLET | Refills: 1 | Status: SHIPPED | OUTPATIENT
Start: 2022-05-31 | End: 2022-11-29 | Stop reason: SDUPTHER

## 2022-05-31 RX ORDER — LEVOTHYROXINE SODIUM 25 UG/1
25 TABLET ORAL
Qty: 90 TABLET | Refills: 1 | Status: SHIPPED | OUTPATIENT
Start: 2022-05-31 | End: 2022-11-29 | Stop reason: SDUPTHER

## 2022-05-31 RX ORDER — OMEPRAZOLE 40 MG/1
40 CAPSULE, DELAYED RELEASE ORAL DAILY
Qty: 90 CAPSULE | Refills: 1 | Status: SHIPPED | OUTPATIENT
Start: 2022-05-31 | End: 2022-11-29 | Stop reason: SDUPTHER

## 2022-05-31 RX ORDER — ROSUVASTATIN CALCIUM 5 MG/1
5 TABLET, COATED ORAL DAILY
Qty: 90 TABLET | Refills: 1 | Status: SHIPPED | OUTPATIENT
Start: 2022-05-31 | End: 2022-11-29 | Stop reason: SDUPTHER

## 2022-06-01 ENCOUNTER — TELEPHONE (OUTPATIENT)
Dept: FAMILY MEDICINE | Facility: CLINIC | Age: 72
End: 2022-06-01

## 2022-06-01 NOTE — TELEPHONE ENCOUNTER
----- Message from Trudi Hopper sent at 6/1/2022 10:17 AM CDT -----  Annette with [facility] called and stated that the patient called and would like to schedule her mammogram and CT lung screening there she would like to have the orders faxed to her [phone] and her phone number is [phone]

## 2022-06-01 NOTE — PROGRESS NOTES
SUBJECTIVE:    Patient ID: Sophia Alberto is a 71 y.o. female.    Chief Complaint: Follow-up and Medication Refill (Stomach problem //  Mammogram ordered // declined Dexa // Lab-results and discuss about Colonoscopy //abc)    This 71-year-old female comes in for six-month checkup.  He had some lower abdominal pains and was prescribed Cipro and Flagyl for presumed diverticulitis.  CT scan was done in the emergency room showing diverticulosis but no diverticulitis.  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  Her abdominal pains are now controlled with Senokot and stool softeners.    Dr. Cortes Doe vascular surgeon has evaluated her 70% stenosis and feels he does not need a stent at this time as she is asymptomatic.  If she gets claudication he may wish to do an intervention.    2014-colonoscopy with Dr. Avilez-RTC 10 years.    She declines all vaccines.    Still smokes cigarettes.      Admission on 12/17/2021, Discharged on 12/17/2021   Component Date Value Ref Range Status    WBC 12/17/2021 8.73  3.90 - 12.70 K/uL Final    RBC 12/17/2021 4.99  4.00 - 5.40 M/uL Final    Hemoglobin 12/17/2021 15.2  12.0 - 16.0 g/dL Final    Hematocrit 12/17/2021 45.6  37.0 - 48.5 % Final    MCV 12/17/2021 91  82 - 98 fL Final    MCH 12/17/2021 30.5  27.0 - 31.0 pg Final    MCHC 12/17/2021 33.3  32.0 - 36.0 g/dL Final    RDW 12/17/2021 12.6  11.5 - 14.5 % Final    Platelets 12/17/2021 274  150 - 450 K/uL Final    MPV 12/17/2021 10.1  9.2 - 12.9 fL Final    Immature Granulocytes 12/17/2021 0.3  0.0 - 0.5 % Final    Gran # (ANC) 12/17/2021 6.1  1.8 - 7.7 K/uL Final    Immature Grans (Abs) 12/17/2021 0.03  0.00 - 0.04 K/uL Final    Lymph # 12/17/2021 1.9  1.0 - 4.8 K/uL Final    Mono # 12/17/2021 0.7  0.3 - 1.0 K/uL Final    Eos # 12/17/2021 0.0  0.0 - 0.5 K/uL Final    Baso # 12/17/2021 0.05  0.00 - 0.20 K/uL Final    nRBC 12/17/2021 0  0 /100 WBC Final    Gran % 12/17/2021 69.5  38.0 - 73.0 %  Final    Lymph % 12/17/2021 21.3  18.0 - 48.0 % Final    Mono % 12/17/2021 8.0  4.0 - 15.0 % Final    Eosinophil % 12/17/2021 0.3  0.0 - 8.0 % Final    Basophil % 12/17/2021 0.6  0.0 - 1.9 % Final    Differential Method 12/17/2021 Automated   Final    Sodium 12/17/2021 141  136 - 145 mmol/L Final    Potassium 12/17/2021 3.8  3.5 - 5.1 mmol/L Final    Chloride 12/17/2021 105  95 - 110 mmol/L Final    CO2 12/17/2021 27  23 - 29 mmol/L Final    Glucose 12/17/2021 96  70 - 110 mg/dL Final    BUN 12/17/2021 11  8 - 23 mg/dL Final    Creatinine 12/17/2021 0.7  0.5 - 1.4 mg/dL Final    Calcium 12/17/2021 9.5  8.7 - 10.5 mg/dL Final    Total Protein 12/17/2021 7.4  6.0 - 8.4 g/dL Final    Albumin 12/17/2021 4.4  3.5 - 5.2 g/dL Final    Total Bilirubin 12/17/2021 0.8  0.1 - 1.0 mg/dL Final    Alkaline Phosphatase 12/17/2021 81  55 - 135 U/L Final    AST 12/17/2021 25  10 - 40 U/L Final    ALT 12/17/2021 26  10 - 44 U/L Final    Anion Gap 12/17/2021 9  8 - 16 mmol/L Final    eGFR if African American 12/17/2021 >60.0  >60 mL/min/1.73 m^2 Final    eGFR if non African American 12/17/2021 >60.0  >60 mL/min/1.73 m^2 Final    Lipase 12/17/2021 28  4 - 60 U/L Final    Specimen UA 12/17/2021 Urine, Clean Catch   Final    Color, UA 12/17/2021 Yellow  Yellow, Straw, Addis Final    Appearance, UA 12/17/2021 Clear  Clear Final    pH, UA 12/17/2021 6.0  5.0 - 8.0 Final    Specific [place], UA 12/17/2021 >1.030 (A) 1.005 - 1.030 Final    Protein, UA 12/17/2021 Negative  Negative Final    Glucose, UA 12/17/2021 Negative  Negative Final    Ketones, UA 12/17/2021 1+ (A) Negative Final    Bilirubin (UA) 12/17/2021 Negative  Negative Final    Occult Blood UA 12/17/2021 Trace (A) Negative Final    Nitrite, UA 12/17/2021 Negative  Negative Final    Urobilinogen, UA 12/17/2021 Negative  Negative EU/dL Final    Leukocytes, UA 12/17/2021 Negative  Negative Final    POC Creatinine 12/17/2021 0.7  0.5 - 1.4 mg/dL  Final    Sample 12/17/2021 VENOUS   Final    POC Creatinine 12/17/2021 0.6  0.5 - 1.4 mg/dL Final    Sample 12/17/2021 VENOUS   Final   Office Visit on 12/15/2021   Component Date Value Ref Range Status    POC Blood, Urine 12/15/2021 Negative  Negative Final    POC Bilirubin, Urine 12/15/2021 Negative  Negative Final    POC Urobilinogen, Urine 12/15/2021 0.2  0.1 - 1.1 Final    POC Ketones, Urine 12/15/2021 Negative  Negative Final    POC Protein, Urine 12/15/2021 Negative  Negative Final    POC Nitrates, Urine 12/15/2021 Negative  Negative Final    POC Glucose, Urine 12/15/2021 Negative  Negative Final    pH, UA 12/15/2021 6.0   Final    POC Specific Gravity, Urine 12/15/2021 1.010  1.003 - 1.029 Final    POC Leukocytes, Urine 12/15/2021 Negative  Negative Final       Past Medical History:   Diagnosis Date    Diverticulosis     History of nuclear stress test 2015    Normal    Hyperlipidemia     Hypertension     Mitral valve prolapse      Social History     Socioeconomic History    Marital status:    Tobacco Use    Smoking status: Current Every Day Smoker     Types: Vaping with nicotine    Smokeless tobacco: Never Used   Substance and Sexual Activity    Drug use: Never     Past Surgical History:   Procedure Laterality Date    CHOLECYSTECTOMY      COLONOSCOPY  2014    Dr. Avilez RTC 10 years    HYSTERECTOMY      LAPAROSCOPY       Family History   Problem Relation Age of Onset    Diabetes Mother     Heart disease Mother     Cancer Father     Breast cancer Maternal Grandmother        Review of patient's allergies indicates:   Allergen Reactions    Meperidine     Penicillins        Current Outpatient Medications:     aspirin (ECOTRIN) 81 MG EC tablet, Take 81 mg by mouth once daily., Disp: , Rfl:     ciprofloxacin HCl (CIPRO) 500 MG tablet, Take 1 tablet (500 mg total) by mouth 2 (two) times daily., Disp: 20 tablet, Rfl: 0    metroNIDAZOLE (FLAGYL) 500 MG tablet, Take 1 tablet  (500 mg total) by mouth 3 (three) times daily., Disp: 30 tablet, Rfl: 0    amLODIPine (NORVASC) 5 MG tablet, Take 1 tablet (5 mg total) by mouth once daily., Disp: 90 tablet, Rfl: 1    levothyroxine (SYNTHROID) 25 MCG tablet, Take 1 tablet (25 mcg total) by mouth before breakfast., Disp: 90 tablet, Rfl: 1    omeprazole (PRILOSEC) 40 MG capsule, Take 1 capsule (40 mg total) by mouth once daily., Disp: 90 capsule, Rfl: 1    raloxifene (EVISTA) 60 mg tablet, Take 1 tablet (60 mg total) by mouth once daily., Disp: 90 tablet, Rfl: 1    rosuvastatin (CRESTOR) 5 MG tablet, Take 1 tablet (5 mg total) by mouth once daily., Disp: 90 tablet, Rfl: 1    Review of Systems   Constitutional: Negative for appetite change, chills, fatigue, fever and unexpected weight change.   HENT: Negative for congestion, ear pain, sinus pain, sore throat and trouble swallowing.    Eyes: Negative for pain, discharge and visual disturbance.   Respiratory: Negative for apnea, cough, shortness of breath and wheezing.    Cardiovascular: Negative for chest pain, palpitations and leg swelling.   Gastrointestinal: Positive for abdominal pain (Left lower quadrant intermittent pains.). Negative for blood in stool, constipation, diarrhea, nausea and vomiting.   Endocrine: Negative for heat intolerance, polydipsia and polyuria.   Genitourinary: Negative for difficulty urinating, dyspareunia, dysuria, frequency, hematuria and menstrual problem.   Musculoskeletal: Negative for arthralgias, back pain, gait problem, joint swelling and myalgias.   Allergic/Immunologic: Negative for environmental allergies, food allergies and immunocompromised state.   Neurological: Negative for dizziness, tremors, seizures, numbness and headaches.   Psychiatric/Behavioral: Negative for behavioral problems, confusion, hallucinations and suicidal ideas. The patient is not nervous/anxious.           Objective:      Vitals:    05/31/22 1445   BP: 110/70   Pulse: 92   Weight: 56.7  "kg (125 lb)   Height: 4' 11" (1.499 m)     Physical Exam  Vitals and nursing note reviewed.   Constitutional:       General: She is not in acute distress.     Appearance: Normal appearance. She is well-developed. She is not toxic-appearing.   HENT:      Head: Normocephalic and atraumatic.      Right Ear: Tympanic membrane and external ear normal.      Left Ear: Tympanic membrane and external ear normal.      Nose: Nose normal.      Mouth/Throat:      Pharynx: Oropharynx is clear.   Eyes:      Pupils: Pupils are equal, round, and reactive to light.   Neck:      Thyroid: No thyromegaly.      Vascular: No carotid bruit.   Cardiovascular:      Rate and Rhythm: Normal rate and regular rhythm.      Heart sounds: Normal heart sounds. No murmur heard.  Pulmonary:      Effort: Pulmonary effort is normal.      Breath sounds: Normal breath sounds. No wheezing or rales.   Abdominal:      General: Bowel sounds are normal. There is no distension.      Palpations: Abdomen is soft.      Tenderness: There is no abdominal tenderness.   Musculoskeletal:         General: No tenderness or deformity. Normal range of motion.      Cervical back: Normal range of motion and neck supple.      Lumbar back: Normal. No spasms.      Comments: Bends 90 degrees at  waist, shoulders and knees have good range of motion without crepitance.  No peripheral edema noted.   Lymphadenopathy:      Cervical: No cervical adenopathy.   Skin:     General: Skin is warm and dry.      Findings: No rash.   Neurological:      Mental Status: She is alert and oriented to person, place, and time. Mental status is at baseline.      Cranial Nerves: No cranial nerve deficit.      Coordination: Coordination normal.   Psychiatric:         Mood and Affect: Mood normal.         Behavior: Behavior normal.         Thought Content: Thought content normal.         Judgment: Judgment normal.           Assessment:       1. Essential hypertension    2. Pure hypercholesterolemia    3. " Adult idiopathic generalized osteoporosis    4. Gastroesophageal reflux disease without esophagitis    5. Acute diverticulitis    6. Acquired hypothyroidism    7. Smoker    8. Other screening mammogram    9. Personal history of nicotine dependence     10. Palpitations    11. Diverticulosis of large intestine without perforation or abscess without bleeding    12. Slow transit constipation    13. Cigarette nicotine dependence without complication    14. Primary hypertension         Plan:       Essential hypertension  -     amLODIPine (NORVASC) 5 MG tablet; Take 1 tablet (5 mg total) by mouth once daily.  Dispense: 90 tablet; Refill: 1  Blood pressure well controlled.  Pure hypercholesterolemia  -     rosuvastatin (CRESTOR) 5 MG tablet; Take 1 tablet (5 mg total) by mouth once daily.  Dispense: 90 tablet; Refill: 1  Cholesterol 144 HDL 53  LDL 68, excellent lipid panel.  Adult idiopathic generalized osteoporosis  -     raloxifene (EVISTA) 60 mg tablet; Take 1 tablet (60 mg total) by mouth once daily.  Dispense: 90 tablet; Refill: 1    Gastroesophageal reflux disease without esophagitis  -     omeprazole (PRILOSEC) 40 MG capsule; Take 1 capsule (40 mg total) by mouth once daily.  Dispense: 90 capsule; Refill: 1  Continue omeprazole  Acute diverticulitis      Acquired hypothyroidism  -     levothyroxine (SYNTHROID) 25 MCG tablet; Take 1 tablet (25 mcg total) by mouth before breakfast.  Dispense: 90 tablet; Refill: 1    Smoker  -     CT Chest Lung Screening Low Dose; Future; Expected date: 05/31/2022  She is interested in low-dose CT of the chest screening  Other screening mammogram  -     Mammo Digital Screening Bilat; Future; Expected date: 05/31/2022  Mammogram ordered  Personal history of nicotine dependence   -     CT Chest Lung Screening Low Dose; Future; Expected date: 05/31/2022    Palpitations    Diverticulosis of large intestine without perforation or abscess without bleeding    Slow transit  constipation  Senokot and stool softeners are working well  Cigarette nicotine dependence without complication    Primary hypertension      Follow up in about 6 months (around 11/30/2022), or htn.        5/31/2022 Deacon Davies

## 2022-06-08 ENCOUNTER — TELEPHONE (OUTPATIENT)
Dept: FAMILY MEDICINE | Facility: CLINIC | Age: 72
End: 2022-06-08

## 2022-06-14 ENCOUNTER — TELEPHONE (OUTPATIENT)
Dept: FAMILY MEDICINE | Facility: CLINIC | Age: 72
End: 2022-06-14

## 2022-06-15 ENCOUNTER — TELEPHONE (OUTPATIENT)
Dept: FAMILY MEDICINE | Facility: CLINIC | Age: 72
End: 2022-06-15

## 2022-06-15 NOTE — TELEPHONE ENCOUNTER
----- Message from Em Freitas LPN sent at 6/14/2021  4:45 PM CDT -----  Pt needs CT of chest one year from 6/14/21

## 2022-06-15 NOTE — TELEPHONE ENCOUNTER
Spoke to patient that CT is due. Said that she had done at [facility] and we call with results. I see this now under media.

## 2022-07-11 ENCOUNTER — OFFICE VISIT (OUTPATIENT)
Dept: FAMILY MEDICINE | Facility: CLINIC | Age: 72
End: 2022-07-11
Payer: MEDICARE

## 2022-07-11 VITALS
WEIGHT: 124 LBS | DIASTOLIC BLOOD PRESSURE: 82 MMHG | HEIGHT: 59 IN | TEMPERATURE: 98 F | SYSTOLIC BLOOD PRESSURE: 136 MMHG | BODY MASS INDEX: 25 KG/M2 | HEART RATE: 112 BPM

## 2022-07-11 DIAGNOSIS — U07.1 COVID-19: ICD-10-CM

## 2022-07-11 DIAGNOSIS — J02.9 SORE THROAT: Primary | ICD-10-CM

## 2022-07-11 LAB
CTP QC/QA: YES
SARS-COV-2 RDRP RESP QL NAA+PROBE: POSITIVE

## 2022-07-11 PROCEDURE — U0002 COVID-19 LAB TEST NON-CDC: HCPCS | Mod: QW,CR,S$GLB, | Performed by: PHYSICIAN ASSISTANT

## 2022-07-11 PROCEDURE — 99213 PR OFFICE/OUTPT VISIT, EST, LEVL III, 20-29 MIN: ICD-10-PCS | Mod: CR,S$GLB,, | Performed by: PHYSICIAN ASSISTANT

## 2022-07-11 PROCEDURE — 99213 OFFICE O/P EST LOW 20 MIN: CPT | Mod: CR,S$GLB,, | Performed by: PHYSICIAN ASSISTANT

## 2022-07-11 PROCEDURE — U0002: ICD-10-PCS | Mod: QW,CR,S$GLB, | Performed by: PHYSICIAN ASSISTANT

## 2022-07-11 NOTE — PROGRESS NOTES
SUBJECTIVE:    Patient ID: Sophia Alberto is a 71 y.o. female.    Chief Complaint: Sore Throat (Achy, headache, low grade fever, has taken tylenol and zicam,  is in hospital with covid, no bottles// SW)    This is a 70 yo wf presenting for urgent evaluation of covid sxs. Achy, sore throat, HA, low grade fever.  is positive as well. Admitted since Saturday. Has not had the vaccine. Hx of HTN, HLD and MVP. Will agree to paxlovid therapy.      No visits with results within 6 Month(s) from this visit.   Latest known visit with results is:   Admission on 12/17/2021, Discharged on 12/17/2021   Component Date Value Ref Range Status    WBC 12/17/2021 8.73  3.90 - 12.70 K/uL Final    RBC 12/17/2021 4.99  4.00 - 5.40 M/uL Final    Hemoglobin 12/17/2021 15.2  12.0 - 16.0 g/dL Final    Hematocrit 12/17/2021 45.6  37.0 - 48.5 % Final    MCV 12/17/2021 91  82 - 98 fL Final    MCH 12/17/2021 30.5  27.0 - 31.0 pg Final    MCHC 12/17/2021 33.3  32.0 - 36.0 g/dL Final    RDW 12/17/2021 12.6  11.5 - 14.5 % Final    Platelets 12/17/2021 274  150 - 450 K/uL Final    MPV 12/17/2021 10.1  9.2 - 12.9 fL Final    Immature Granulocytes 12/17/2021 0.3  0.0 - 0.5 % Final    Gran # (ANC) 12/17/2021 6.1  1.8 - 7.7 K/uL Final    Immature Grans (Abs) 12/17/2021 0.03  0.00 - 0.04 K/uL Final    Lymph # 12/17/2021 1.9  1.0 - 4.8 K/uL Final    Mono # 12/17/2021 0.7  0.3 - 1.0 K/uL Final    Eos # 12/17/2021 0.0  0.0 - 0.5 K/uL Final    Baso # 12/17/2021 0.05  0.00 - 0.20 K/uL Final    nRBC 12/17/2021 0  0 /100 WBC Final    Gran % 12/17/2021 69.5  38.0 - 73.0 % Final    Lymph % 12/17/2021 21.3  18.0 - 48.0 % Final    Mono % 12/17/2021 8.0  4.0 - 15.0 % Final    Eosinophil % 12/17/2021 0.3  0.0 - 8.0 % Final    Basophil % 12/17/2021 0.6  0.0 - 1.9 % Final    Differential Method 12/17/2021 Automated   Final    Sodium 12/17/2021 141  136 - 145 mmol/L Final    Potassium 12/17/2021 3.8  3.5 - 5.1 mmol/L Final     Chloride 12/17/2021 105  95 - 110 mmol/L Final    CO2 12/17/2021 27  23 - 29 mmol/L Final    Glucose 12/17/2021 96  70 - 110 mg/dL Final    BUN 12/17/2021 11  8 - 23 mg/dL Final    Creatinine 12/17/2021 0.7  0.5 - 1.4 mg/dL Final    Calcium 12/17/2021 9.5  8.7 - 10.5 mg/dL Final    Total Protein 12/17/2021 7.4  6.0 - 8.4 g/dL Final    Albumin 12/17/2021 4.4  3.5 - 5.2 g/dL Final    Total Bilirubin 12/17/2021 0.8  0.1 - 1.0 mg/dL Final    Alkaline Phosphatase 12/17/2021 81  55 - 135 U/L Final    AST 12/17/2021 25  10 - 40 U/L Final    ALT 12/17/2021 26  10 - 44 U/L Final    Anion Gap 12/17/2021 9  8 - 16 mmol/L Final    eGFR if African American 12/17/2021 >60.0  >60 mL/min/1.73 m^2 Final    eGFR if non African American 12/17/2021 >60.0  >60 mL/min/1.73 m^2 Final    Lipase 12/17/2021 28  4 - 60 U/L Final    Specimen UA 12/17/2021 Urine, Clean Catch   Final    Color, UA 12/17/2021 Yellow  Yellow, Straw, Addis Final    Appearance, UA 12/17/2021 Clear  Clear Final    pH, UA 12/17/2021 6.0  5.0 - 8.0 Final    Specific [place], UA 12/17/2021 >1.030 (A) 1.005 - 1.030 Final    Protein, UA 12/17/2021 Negative  Negative Final    Glucose, UA 12/17/2021 Negative  Negative Final    Ketones, UA 12/17/2021 1+ (A) Negative Final    Bilirubin (UA) 12/17/2021 Negative  Negative Final    Occult Blood UA 12/17/2021 Trace (A) Negative Final    Nitrite, UA 12/17/2021 Negative  Negative Final    Urobilinogen, UA 12/17/2021 Negative  Negative EU/dL Final    Leukocytes, UA 12/17/2021 Negative  Negative Final    POC Creatinine 12/17/2021 0.7  0.5 - 1.4 mg/dL Final    Sample 12/17/2021 VENOUS   Final    POC Creatinine 12/17/2021 0.6  0.5 - 1.4 mg/dL Final    Sample 12/17/2021 VENOUS   Final       Past Medical History:   Diagnosis Date    Diverticulosis     History of nuclear stress test 2015    Normal    Hyperlipidemia     Hypertension     Mitral valve prolapse      Past Surgical History:   Procedure  Laterality Date    CHOLECYSTECTOMY      COLONOSCOPY  2014    Dr. Avilez RTC 10 years    HYSTERECTOMY      LAPAROSCOPY       Family History   Problem Relation Age of Onset    Diabetes Mother     Heart disease Mother     Cancer Father     Breast cancer Maternal Grandmother        Marital Status:   Alcohol History:  has no history on file for alcohol use.  Tobacco History:  reports that she has been smoking vaping with nicotine. She has never used smokeless tobacco.  Drug History:  reports no history of drug use.    Review of patient's allergies indicates:   Allergen Reactions    Meperidine     Penicillins        Current Outpatient Medications:     amLODIPine (NORVASC) 5 MG tablet, Take 1 tablet (5 mg total) by mouth once daily., Disp: 90 tablet, Rfl: 1    aspirin (ECOTRIN) 81 MG EC tablet, Take 81 mg by mouth once daily., Disp: , Rfl:     ciprofloxacin HCl (CIPRO) 500 MG tablet, Take 1 tablet (500 mg total) by mouth 2 (two) times daily., Disp: 20 tablet, Rfl: 0    levothyroxine (SYNTHROID) 25 MCG tablet, Take 1 tablet (25 mcg total) by mouth before breakfast., Disp: 90 tablet, Rfl: 1    metroNIDAZOLE (FLAGYL) 500 MG tablet, Take 1 tablet (500 mg total) by mouth 3 (three) times daily., Disp: 30 tablet, Rfl: 0    nirmatrelvir-ritonavir 150 mg x 2- 100 mg copackaged tablets (EUA), Take 3 tablets by mouth 2 (two) times daily for 5 days. Each dose contains 2 nirmatrelvir (pink tablets) and 1 ritonavir (white tablet). Take all 3 tablets together, Disp: 30 tablet, Rfl: 0    omeprazole (PRILOSEC) 40 MG capsule, Take 1 capsule (40 mg total) by mouth once daily., Disp: 90 capsule, Rfl: 1    raloxifene (EVISTA) 60 mg tablet, Take 1 tablet (60 mg total) by mouth once daily., Disp: 90 tablet, Rfl: 1    rosuvastatin (CRESTOR) 5 MG tablet, Take 1 tablet (5 mg total) by mouth once daily., Disp: 90 tablet, Rfl: 1    Review of Systems   Constitutional: Positive for chills and fever.   HENT: Positive for sore  "throat. Negative for congestion.    Respiratory: Negative for cough and shortness of breath.    Neurological: Positive for headaches.          Objective:      Vitals:    07/11/22 1127   BP: 136/82   Pulse: (!) 112   Temp: 98.3 °F (36.8 °C)   Weight: 56.2 kg (124 lb)   Height: 4' 11" (1.499 m)     Physical Exam  Constitutional:       General: She is not in acute distress.     Appearance: She is well-developed.   HENT:      Head: Normocephalic and atraumatic.   Eyes:      Conjunctiva/sclera: Conjunctivae normal.      Pupils: Pupils are equal, round, and reactive to light.   Neck:      Thyroid: No thyromegaly.   Cardiovascular:      Rate and Rhythm: Normal rate and regular rhythm.      Heart sounds: Normal heart sounds.   Pulmonary:      Effort: Pulmonary effort is normal.      Breath sounds: Normal breath sounds.   Abdominal:      General: Bowel sounds are normal. There is no distension.      Palpations: Abdomen is soft.      Tenderness: There is no abdominal tenderness.   Musculoskeletal:         General: Normal range of motion.      Cervical back: Normal range of motion and neck supple.   Skin:     General: Skin is warm and dry.      Findings: No erythema.   Neurological:      Mental Status: She is alert and oriented to person, place, and time.      Cranial Nerves: No cranial nerve deficit.           Assessment:       1. Sore throat    2. COVID-19         Plan:       Sore throat  -     POCT COVID-19 Rapid Screening    COVID-19  Comments:  rest, push fluids. will treat with paxlovid. alternate tylenol/motrin for fever. symptom onset yesterday. isolate 5 days and another 5 with mask  Orders:  -     Discontinue: nirmatrelvir-ritonavir 150 mg x 2- 100 mg copackaged tablets (EUA); Take 3 tablets by mouth 2 (two) times daily for 5 days. Each dose contains 2 nirmatrelvir (pink tablets) and 1 ritonavir (white tablet). Take all 3 tablets together  Dispense: 30 tablet; Refill: 0  -     nirmatrelvir-ritonavir 150 mg x 2- 100 " mg copackaged tablets (EUA); Take 3 tablets by mouth 2 (two) times daily for 5 days. Each dose contains 2 nirmatrelvir (pink tablets) and 1 ritonavir (white tablet). Take all 3 tablets together  Dispense: 30 tablet; Refill: 0      Follow up if symptoms worsen or fail to improve.        7/11/2022 Elvin Gonzalez PA-C

## 2022-07-19 ENCOUNTER — TELEPHONE (OUTPATIENT)
Dept: FAMILY MEDICINE | Facility: CLINIC | Age: 72
End: 2022-07-19

## 2022-07-19 ENCOUNTER — HOSPITAL ENCOUNTER (OUTPATIENT)
Dept: RADIOLOGY | Facility: HOSPITAL | Age: 72
Discharge: HOME | End: 2022-07-19
Attending: PHYSICIAN ASSISTANT
Payer: MEDICARE

## 2022-07-19 DIAGNOSIS — U07.1 COVID-19: ICD-10-CM

## 2022-07-19 DIAGNOSIS — U07.1 COVID-19: Primary | ICD-10-CM

## 2022-07-19 PROCEDURE — 71046 X-RAY EXAM CHEST 2 VIEWS: CPT | Mod: TC,PO

## 2022-07-19 NOTE — TELEPHONE ENCOUNTER
"Spoke with pt who states she took claritin the last few days. States she is still having congestion "mostly sinus issues" States everything is clear now but when she coughs her "left lung feels sore/bruised" no fever. No trouble breathing or shortness of breath.  CXR?  "

## 2022-07-19 NOTE — TELEPHONE ENCOUNTER
----- Message from Hellen Perez sent at 7/19/2022  3:28 PM CDT -----  Pt did her chest x-ray and wants to know what to do now? Please advise. Pt #[phone]

## 2022-07-19 NOTE — TELEPHONE ENCOUNTER
Lets advise patient no evidence of pna. Rest and push fluids. If sxs worsening by the end of the week I will want to see her or we may send in abx if needed

## 2022-07-19 NOTE — TELEPHONE ENCOUNTER
Spoke to pt . Pt only took 3 doses of the paxlovid. She was unable to tolerate the mediation. Pt is getting xray done now/

## 2022-07-19 NOTE — TELEPHONE ENCOUNTER
Chest x-ray has been ordered.  Let us make sure that she did he get and complete the paxlovid prescription that was sent in at her office visit lax week.

## 2022-07-19 NOTE — TELEPHONE ENCOUNTER
----- Message from Trudi Hopper sent at 7/19/2022  9:09 AM CDT -----  Patient called and stated that she tested positive for Covid and she would like to know if there is something that can she take for congestions either over the counter or prescription please give her a call at [phone]

## 2022-11-23 ENCOUNTER — TELEPHONE (OUTPATIENT)
Dept: FAMILY MEDICINE | Facility: CLINIC | Age: 72
End: 2022-11-23

## 2022-11-29 DIAGNOSIS — E03.9 ACQUIRED HYPOTHYROIDISM: ICD-10-CM

## 2022-11-29 DIAGNOSIS — I10 ESSENTIAL HYPERTENSION: ICD-10-CM

## 2022-11-29 DIAGNOSIS — M81.8 ADULT IDIOPATHIC GENERALIZED OSTEOPOROSIS: ICD-10-CM

## 2022-11-29 DIAGNOSIS — K21.9 GASTROESOPHAGEAL REFLUX DISEASE WITHOUT ESOPHAGITIS: ICD-10-CM

## 2022-11-29 DIAGNOSIS — E78.00 PURE HYPERCHOLESTEROLEMIA: ICD-10-CM

## 2022-11-29 RX ORDER — ROSUVASTATIN CALCIUM 5 MG/1
5 TABLET, COATED ORAL DAILY
Qty: 90 TABLET | Refills: 1 | Status: SHIPPED | OUTPATIENT
Start: 2022-11-29 | End: 2023-06-06 | Stop reason: SDUPTHER

## 2022-11-29 RX ORDER — LEVOTHYROXINE SODIUM 25 UG/1
25 TABLET ORAL
Qty: 90 TABLET | Refills: 1 | Status: SHIPPED | OUTPATIENT
Start: 2022-11-29 | End: 2023-06-06 | Stop reason: SDUPTHER

## 2022-11-29 RX ORDER — OMEPRAZOLE 40 MG/1
40 CAPSULE, DELAYED RELEASE ORAL DAILY
Qty: 90 CAPSULE | Refills: 1 | Status: SHIPPED | OUTPATIENT
Start: 2022-11-29 | End: 2023-06-06 | Stop reason: SDUPTHER

## 2022-11-29 RX ORDER — AMLODIPINE BESYLATE 5 MG/1
5 TABLET ORAL DAILY
Qty: 90 TABLET | Refills: 1 | Status: SHIPPED | OUTPATIENT
Start: 2022-11-29 | End: 2023-05-31 | Stop reason: SDUPTHER

## 2022-11-29 RX ORDER — RALOXIFENE HYDROCHLORIDE 60 MG/1
60 TABLET, FILM COATED ORAL DAILY
Qty: 90 TABLET | Refills: 1 | Status: SHIPPED | OUTPATIENT
Start: 2022-11-29 | End: 2023-06-06 | Stop reason: SDUPTHER

## 2022-11-29 NOTE — TELEPHONE ENCOUNTER
----- Message from Serene Britton sent at 11/29/2022 10:57 AM CST -----  Pt needs refill on her meds before her appt as she is running low and they come by mail   [facility]   [phone]

## 2022-12-01 LAB
ALBUMIN SERPL-MCNC: 4.1 G/DL (ref 3.6–5.1)
ALBUMIN/CREAT UR: 16 MCG/MG CREAT
ALBUMIN/GLOB SERPL: 2 (CALC) (ref 1–2.5)
ALP SERPL-CCNC: 74 U/L (ref 37–153)
ALT SERPL-CCNC: 19 U/L (ref 6–29)
APPEARANCE UR: ABNORMAL
AST SERPL-CCNC: 18 U/L (ref 10–35)
BACTERIA #/AREA URNS HPF: ABNORMAL /HPF
BACTERIA UR CULT: ABNORMAL
BASOPHILS # BLD AUTO: 70 CELLS/UL (ref 0–200)
BASOPHILS NFR BLD AUTO: 0.8 %
BILIRUB SERPL-MCNC: 0.6 MG/DL (ref 0.2–1.2)
BILIRUB UR QL STRIP: NEGATIVE
BUN SERPL-MCNC: 11 MG/DL (ref 7–25)
BUN/CREAT SERPL: ABNORMAL (CALC) (ref 6–22)
CALCIUM SERPL-MCNC: 8.7 MG/DL (ref 8.6–10.4)
CHLORIDE SERPL-SCNC: 104 MMOL/L (ref 98–110)
CHOLEST SERPL-MCNC: 149 MG/DL
CHOLEST/HDLC SERPL: 2.5 (CALC)
CO2 SERPL-SCNC: 27 MMOL/L (ref 20–32)
COLOR UR: YELLOW
CREAT SERPL-MCNC: 0.7 MG/DL (ref 0.6–1)
CREAT UR-MCNC: 69 MG/DL (ref 20–275)
EGFR: 91 ML/MIN/1.73M2
EOSINOPHIL # BLD AUTO: 211 CELLS/UL (ref 15–500)
EOSINOPHIL NFR BLD AUTO: 2.4 %
ERYTHROCYTE [DISTWIDTH] IN BLOOD BY AUTOMATED COUNT: 13 % (ref 11–15)
GLOBULIN SER CALC-MCNC: 2.1 G/DL (CALC) (ref 1.9–3.7)
GLUCOSE SERPL-MCNC: 113 MG/DL (ref 65–99)
GLUCOSE UR QL STRIP: NEGATIVE
HCT VFR BLD AUTO: 44.3 % (ref 35–45)
HDLC SERPL-MCNC: 60 MG/DL
HGB BLD-MCNC: 14.4 G/DL (ref 11.7–15.5)
HGB UR QL STRIP: NEGATIVE
HYALINE CASTS #/AREA URNS LPF: ABNORMAL /LPF
KETONES UR QL STRIP: NEGATIVE
LDLC SERPL CALC-MCNC: 66 MG/DL (CALC)
LEUKOCYTE ESTERASE UR QL STRIP: NEGATIVE
LYMPHOCYTES # BLD AUTO: 2279 CELLS/UL (ref 850–3900)
LYMPHOCYTES NFR BLD AUTO: 25.9 %
MCH RBC QN AUTO: 31.3 PG (ref 27–33)
MCHC RBC AUTO-ENTMCNC: 32.5 G/DL (ref 32–36)
MCV RBC AUTO: 96.3 FL (ref 80–100)
MICROALBUMIN UR-MCNC: 1.1 MG/DL
MONOCYTES # BLD AUTO: 722 CELLS/UL (ref 200–950)
MONOCYTES NFR BLD AUTO: 8.2 %
NEUTROPHILS # BLD AUTO: 5518 CELLS/UL (ref 1500–7800)
NEUTROPHILS NFR BLD AUTO: 62.7 %
NITRITE UR QL STRIP: NEGATIVE
NONHDLC SERPL-MCNC: 89 MG/DL (CALC)
PH UR STRIP: 8 [PH] (ref 5–8)
PLATELET # BLD AUTO: 249 THOUSAND/UL (ref 140–400)
PMV BLD REES-ECKER: 10.6 FL (ref 7.5–12.5)
POTASSIUM SERPL-SCNC: 3.8 MMOL/L (ref 3.5–5.3)
PROT SERPL-MCNC: 6.2 G/DL (ref 6.1–8.1)
PROT UR QL STRIP: NEGATIVE
RBC # BLD AUTO: 4.6 MILLION/UL (ref 3.8–5.1)
RBC #/AREA URNS HPF: ABNORMAL /HPF
SERVICE CMNT-IMP: ABNORMAL
SODIUM SERPL-SCNC: 142 MMOL/L (ref 135–146)
SP GR UR STRIP: 1.01 (ref 1–1.03)
SQUAMOUS #/AREA URNS HPF: ABNORMAL /HPF
TRIGL SERPL-MCNC: 152 MG/DL
TSH SERPL-ACNC: 2.81 MIU/L (ref 0.4–4.5)
WBC # BLD AUTO: 8.8 THOUSAND/UL (ref 3.8–10.8)
WBC #/AREA URNS HPF: ABNORMAL /HPF

## 2022-12-06 ENCOUNTER — OFFICE VISIT (OUTPATIENT)
Dept: FAMILY MEDICINE | Facility: CLINIC | Age: 72
End: 2022-12-06
Payer: MEDICARE

## 2022-12-06 VITALS
HEART RATE: 88 BPM | WEIGHT: 123 LBS | BODY MASS INDEX: 24.8 KG/M2 | HEIGHT: 59 IN | SYSTOLIC BLOOD PRESSURE: 120 MMHG | DIASTOLIC BLOOD PRESSURE: 80 MMHG

## 2022-12-06 DIAGNOSIS — K21.9 GASTROESOPHAGEAL REFLUX DISEASE WITHOUT ESOPHAGITIS: ICD-10-CM

## 2022-12-06 DIAGNOSIS — I34.1 MVP (MITRAL VALVE PROLAPSE): ICD-10-CM

## 2022-12-06 DIAGNOSIS — I10 ESSENTIAL HYPERTENSION: ICD-10-CM

## 2022-12-06 DIAGNOSIS — E78.00 PURE HYPERCHOLESTEROLEMIA: ICD-10-CM

## 2022-12-06 DIAGNOSIS — M81.8 ADULT IDIOPATHIC GENERALIZED OSTEOPOROSIS: ICD-10-CM

## 2022-12-06 DIAGNOSIS — K59.01 SLOW TRANSIT CONSTIPATION: ICD-10-CM

## 2022-12-06 DIAGNOSIS — L72.3 SEBACEOUS CYST: Primary | ICD-10-CM

## 2022-12-06 DIAGNOSIS — I10 PRIMARY HYPERTENSION: ICD-10-CM

## 2022-12-06 DIAGNOSIS — E03.9 ACQUIRED HYPOTHYROIDISM: ICD-10-CM

## 2022-12-06 PROCEDURE — 99214 OFFICE O/P EST MOD 30 MIN: CPT | Mod: S$GLB,,, | Performed by: FAMILY MEDICINE

## 2022-12-06 PROCEDURE — 99214 PR OFFICE/OUTPT VISIT, EST, LEVL IV, 30-39 MIN: ICD-10-PCS | Mod: S$GLB,,, | Performed by: FAMILY MEDICINE

## 2022-12-06 RX ORDER — RALOXIFENE HYDROCHLORIDE 60 MG/1
60 TABLET, FILM COATED ORAL DAILY
Qty: 90 TABLET | Refills: 1 | Status: CANCELLED | OUTPATIENT
Start: 2022-12-06

## 2022-12-06 RX ORDER — OMEPRAZOLE 40 MG/1
40 CAPSULE, DELAYED RELEASE ORAL DAILY
Qty: 90 CAPSULE | Refills: 1 | Status: CANCELLED | OUTPATIENT
Start: 2022-12-06

## 2022-12-06 RX ORDER — AMLODIPINE BESYLATE 5 MG/1
5 TABLET ORAL DAILY
Qty: 90 TABLET | Refills: 1 | Status: CANCELLED | OUTPATIENT
Start: 2022-12-06 | End: 2023-06-04

## 2022-12-06 RX ORDER — LEVOTHYROXINE SODIUM 25 UG/1
25 TABLET ORAL
Qty: 90 TABLET | Refills: 1 | Status: CANCELLED | OUTPATIENT
Start: 2022-12-06 | End: 2023-12-06

## 2022-12-06 RX ORDER — ROSUVASTATIN CALCIUM 5 MG/1
5 TABLET, COATED ORAL DAILY
Qty: 90 TABLET | Refills: 1 | Status: CANCELLED | OUTPATIENT
Start: 2022-12-06

## 2022-12-06 NOTE — PROGRESS NOTES
SUBJECTIVE:    Patient ID: Sophia Alberto is a 72 y.o. female.    Chief Complaint: Hypertension (Brought bottles, need refills, declined Dexa, Fatigue, abc)    72-year-old female here for six-month checkup.  She is been doing her [place] shopping and regular chores.  She states she is feeling fine.    Recently saw Ophthalmology.  She has some early cataracts.    She quit smoking cigarettes 5 years ago, still vapes a bit daily.  Occasional alcohol intake.    2022-mammogram normal.    She declines colorectal screening, declines immunizations.    Has a small sebaceous cyst on her back.      Office Visit on 07/11/2022   Component Date Value Ref Range Status    POC Rapid COVID 07/11/2022 Positive (A)  Negative Final     Acceptable 07/11/2022 Yes   Final       Past Medical History:   Diagnosis Date    Diverticulosis     History of nuclear stress test 2015    Normal    Hyperlipidemia     Hypertension     Mitral valve prolapse      Social History     Socioeconomic History    Marital status:    Tobacco Use    Smoking status: Every Day     Types: Vaping with nicotine    Smokeless tobacco: Never   Substance and Sexual Activity    Drug use: Never     Past Surgical History:   Procedure Laterality Date    CHOLECYSTECTOMY      COLONOSCOPY  2014    Dr. Avilez RTC 10 years    HYSTERECTOMY      LAPAROSCOPY       Family History   Problem Relation Age of Onset    Diabetes Mother     Heart disease Mother     Cancer Father     Breast cancer Maternal Grandmother        Review of patient's allergies indicates:   Allergen Reactions    Meperidine     Penicillins        Current Outpatient Medications:     amLODIPine (NORVASC) 5 MG tablet, Take 1 tablet (5 mg total) by mouth once daily., Disp: 90 tablet, Rfl: 1    aspirin (ECOTRIN) 81 MG EC tablet, Take 81 mg by mouth once daily., Disp: , Rfl:     levothyroxine (SYNTHROID) 25 MCG tablet, Take 1 tablet (25 mcg total) by mouth before breakfast., Disp: 90 tablet,  Rfl: 1    omeprazole (PRILOSEC) 40 MG capsule, Take 1 capsule (40 mg total) by mouth once daily., Disp: 90 capsule, Rfl: 1    raloxifene (EVISTA) 60 mg tablet, Take 1 tablet (60 mg total) by mouth once daily., Disp: 90 tablet, Rfl: 1    rosuvastatin (CRESTOR) 5 MG tablet, Take 1 tablet (5 mg total) by mouth once daily., Disp: 90 tablet, Rfl: 1    ciprofloxacin HCl (CIPRO) 500 MG tablet, Take 1 tablet (500 mg total) by mouth 2 (two) times daily. (Patient not taking: Reported on 12/6/2022), Disp: 20 tablet, Rfl: 0    metroNIDAZOLE (FLAGYL) 500 MG tablet, Take 1 tablet (500 mg total) by mouth 3 (three) times daily. (Patient not taking: Reported on 12/6/2022), Disp: 30 tablet, Rfl: 0    Review of Systems   Constitutional:  Negative for appetite change, chills, fatigue, fever and unexpected weight change.   HENT:  Negative for congestion, ear pain, sinus pain, sore throat and trouble swallowing.    Eyes:  Negative for pain, discharge and visual disturbance.   Respiratory:  Negative for apnea, cough, shortness of breath and wheezing.    Cardiovascular:  Negative for chest pain, palpitations and leg swelling.   Gastrointestinal:  Positive for constipation (senokot or  stool softeners). Negative for abdominal pain, blood in stool, diarrhea, nausea and vomiting.   Endocrine: Negative for heat intolerance, polydipsia and polyuria.   Genitourinary:  Negative for difficulty urinating, dyspareunia, dysuria, frequency, hematuria and menstrual problem.   Musculoskeletal:  Positive for arthralgias (intermittant  rt knee pain). Negative for back pain, gait problem, joint swelling and myalgias.   Allergic/Immunologic: Negative for environmental allergies, food allergies and immunocompromised state.   Neurological:  Negative for dizziness, tremors, seizures, numbness and headaches.   Psychiatric/Behavioral:  Positive for sleep disturbance. Negative for behavioral problems, confusion, hallucinations and suicidal ideas. The patient is  "nervous/anxious.         Objective:      Vitals:    12/06/22 1106   BP: 120/80   Pulse: 88   Weight: 55.8 kg (123 lb)   Height: 4' 11" (1.499 m)     Physical Exam  Vitals and nursing note reviewed.   Constitutional:       General: She is not in acute distress.     Appearance: Normal appearance. She is well-developed and normal weight. She is not toxic-appearing.   HENT:      Head: Normocephalic and atraumatic.      Right Ear: Tympanic membrane and external ear normal.      Left Ear: Tympanic membrane and external ear normal.      Nose: Nose normal.      Mouth/Throat:      Pharynx: Oropharynx is clear.   Eyes:      Pupils: Pupils are equal, round, and reactive to light.   Neck:      Thyroid: No thyromegaly.      Vascular: No carotid bruit.   Cardiovascular:      Rate and Rhythm: Normal rate and regular rhythm.      Heart sounds: Normal heart sounds. No murmur heard.  Pulmonary:      Effort: Pulmonary effort is normal.      Breath sounds: Normal breath sounds. No wheezing or rales.   Abdominal:      General: Bowel sounds are normal. There is no distension.      Palpations: Abdomen is soft.      Tenderness: There is no abdominal tenderness.   Musculoskeletal:         General: No tenderness or deformity. Normal range of motion.      Cervical back: Normal range of motion and neck supple.      Lumbar back: Normal. No spasms.      Comments: Bends 90 degrees at  waist, shoulders and knees good range of motion without crepitance no pitting edema to lower extremities   Lymphadenopathy:      Cervical: No cervical adenopathy.   Skin:     General: Skin is warm and dry.      Findings: No rash.   Neurological:      Mental Status: She is alert and oriented to person, place, and time. Mental status is at baseline.      Cranial Nerves: No cranial nerve deficit.      Coordination: Coordination normal.   Psychiatric:         Mood and Affect: Mood normal.         Behavior: Behavior normal.         Thought Content: Thought content " normal.         Judgment: Judgment normal.         Assessment:       1. Sebaceous cyst    2. Gastroesophageal reflux disease without esophagitis    3. Acquired hypothyroidism    4. Essential hypertension    5. Pure hypercholesterolemia    6. Adult idiopathic generalized osteoporosis    7. Primary hypertension    8. MVP (mitral valve prolapse)    9. Slow transit constipation           Plan:       Sebaceous cyst  Patient has sebaceous cyst on her back.  No need for urgent removal at this time.  Gastroesophageal reflux disease without esophagitis    Acquired hypothyroidism  Continue current medications  Essential hypertension  Blood pressure well controlled at this time.  Pure hypercholesterolemia  Cholesterol 149 HDL 60  LDL  66 excellent lipid panel.  Adult idiopathic generalized osteoporosis    Primary hypertension    MVP (mitral valve prolapse)    Slow transit constipation    Follow up in about 6 months (around 6/6/2023), or Hyper lipidemia.        12/9/2022 Deacon Davies

## 2023-05-18 ENCOUNTER — TELEPHONE (OUTPATIENT)
Dept: FAMILY MEDICINE | Facility: CLINIC | Age: 73
End: 2023-05-18

## 2023-05-31 DIAGNOSIS — I10 ESSENTIAL HYPERTENSION: ICD-10-CM

## 2023-05-31 RX ORDER — AMLODIPINE BESYLATE 5 MG/1
5 TABLET ORAL DAILY
Qty: 90 TABLET | Refills: 1 | Status: SHIPPED | OUTPATIENT
Start: 2023-05-31 | End: 2023-06-07 | Stop reason: SDUPTHER

## 2023-05-31 NOTE — TELEPHONE ENCOUNTER
----- Message from Kalin Orozco MA sent at 5/31/2023  8:48 AM CDT -----  Regarding: refill  [facility]  on June 6th and would hillary al meds refilled now. [phone]

## 2023-06-03 LAB
ALBUMIN SERPL-MCNC: 4 G/DL (ref 3.6–5.1)
ALBUMIN/GLOB SERPL: 1.7 (CALC) (ref 1–2.5)
ALP SERPL-CCNC: 73 U/L (ref 37–153)
ALT SERPL-CCNC: 21 U/L (ref 6–29)
AST SERPL-CCNC: 21 U/L (ref 10–35)
BASOPHILS # BLD AUTO: 54 CELLS/UL (ref 0–200)
BASOPHILS NFR BLD AUTO: 0.6 %
BILIRUB SERPL-MCNC: 0.5 MG/DL (ref 0.2–1.2)
BUN SERPL-MCNC: 14 MG/DL (ref 7–25)
BUN/CREAT SERPL: ABNORMAL (CALC) (ref 6–22)
CALCIUM SERPL-MCNC: 8.7 MG/DL (ref 8.6–10.4)
CHLORIDE SERPL-SCNC: 105 MMOL/L (ref 98–110)
CHOLEST SERPL-MCNC: 159 MG/DL
CHOLEST/HDLC SERPL: 2.4 (CALC)
CO2 SERPL-SCNC: 29 MMOL/L (ref 20–32)
CREAT SERPL-MCNC: 0.68 MG/DL (ref 0.6–1)
EGFR: 92 ML/MIN/1.73M2
EOSINOPHIL # BLD AUTO: 153 CELLS/UL (ref 15–500)
EOSINOPHIL NFR BLD AUTO: 1.7 %
ERYTHROCYTE [DISTWIDTH] IN BLOOD BY AUTOMATED COUNT: 12.7 % (ref 11–15)
GLOBULIN SER CALC-MCNC: 2.3 G/DL (CALC) (ref 1.9–3.7)
GLUCOSE SERPL-MCNC: 108 MG/DL (ref 65–99)
HCT VFR BLD AUTO: 44.9 % (ref 35–45)
HDLC SERPL-MCNC: 66 MG/DL
HGB BLD-MCNC: 15 G/DL (ref 11.7–15.5)
LDLC SERPL CALC-MCNC: 71 MG/DL (CALC)
LYMPHOCYTES # BLD AUTO: 2061 CELLS/UL (ref 850–3900)
LYMPHOCYTES NFR BLD AUTO: 22.9 %
MCH RBC QN AUTO: 31.3 PG (ref 27–33)
MCHC RBC AUTO-ENTMCNC: 33.4 G/DL (ref 32–36)
MCV RBC AUTO: 93.5 FL (ref 80–100)
MONOCYTES # BLD AUTO: 594 CELLS/UL (ref 200–950)
MONOCYTES NFR BLD AUTO: 6.6 %
NEUTROPHILS # BLD AUTO: 6138 CELLS/UL (ref 1500–7800)
NEUTROPHILS NFR BLD AUTO: 68.2 %
NONHDLC SERPL-MCNC: 93 MG/DL (CALC)
PLATELET # BLD AUTO: 235 THOUSAND/UL (ref 140–400)
PMV BLD REES-ECKER: 10.4 FL (ref 7.5–12.5)
POTASSIUM SERPL-SCNC: 4.3 MMOL/L (ref 3.5–5.3)
PROT SERPL-MCNC: 6.3 G/DL (ref 6.1–8.1)
RBC # BLD AUTO: 4.8 MILLION/UL (ref 3.8–5.1)
SODIUM SERPL-SCNC: 142 MMOL/L (ref 135–146)
TRIGL SERPL-MCNC: 138 MG/DL
TSH SERPL-ACNC: 2.07 MIU/L (ref 0.4–4.5)
WBC # BLD AUTO: 9 THOUSAND/UL (ref 3.8–10.8)

## 2023-06-06 ENCOUNTER — OFFICE VISIT (OUTPATIENT)
Dept: FAMILY MEDICINE | Facility: CLINIC | Age: 73
End: 2023-06-06
Payer: MEDICARE

## 2023-06-06 VITALS
BODY MASS INDEX: 25 KG/M2 | HEIGHT: 59 IN | SYSTOLIC BLOOD PRESSURE: 128 MMHG | HEART RATE: 99 BPM | WEIGHT: 124 LBS | DIASTOLIC BLOOD PRESSURE: 80 MMHG

## 2023-06-06 DIAGNOSIS — M17.10 ARTHRITIS OF KNEE: Primary | ICD-10-CM

## 2023-06-06 DIAGNOSIS — Z12.11 SPECIAL SCREENING FOR MALIGNANT NEOPLASMS, COLON: ICD-10-CM

## 2023-06-06 DIAGNOSIS — K59.01 SLOW TRANSIT CONSTIPATION: ICD-10-CM

## 2023-06-06 DIAGNOSIS — M81.8 ADULT IDIOPATHIC GENERALIZED OSTEOPOROSIS: ICD-10-CM

## 2023-06-06 DIAGNOSIS — R00.2 PALPITATIONS: ICD-10-CM

## 2023-06-06 DIAGNOSIS — E03.9 ACQUIRED HYPOTHYROIDISM: ICD-10-CM

## 2023-06-06 DIAGNOSIS — I10 PRIMARY HYPERTENSION: ICD-10-CM

## 2023-06-06 DIAGNOSIS — I34.1 MVP (MITRAL VALVE PROLAPSE): ICD-10-CM

## 2023-06-06 DIAGNOSIS — J43.8 OTHER EMPHYSEMA: ICD-10-CM

## 2023-06-06 DIAGNOSIS — F17.210 CIGARETTE NICOTINE DEPENDENCE WITHOUT COMPLICATION: ICD-10-CM

## 2023-06-06 DIAGNOSIS — E78.00 PURE HYPERCHOLESTEROLEMIA: ICD-10-CM

## 2023-06-06 DIAGNOSIS — Z12.31 SCREENING MAMMOGRAM FOR HIGH-RISK PATIENT: ICD-10-CM

## 2023-06-06 DIAGNOSIS — K57.30 DIVERTICULOSIS OF LARGE INTESTINE WITHOUT PERFORATION OR ABSCESS WITHOUT BLEEDING: ICD-10-CM

## 2023-06-06 DIAGNOSIS — M75.42 IMPINGEMENT SYNDROME OF LEFT SHOULDER: ICD-10-CM

## 2023-06-06 DIAGNOSIS — K21.9 GASTROESOPHAGEAL REFLUX DISEASE WITHOUT ESOPHAGITIS: ICD-10-CM

## 2023-06-06 DIAGNOSIS — I10 ESSENTIAL HYPERTENSION: ICD-10-CM

## 2023-06-06 PROCEDURE — 99214 PR OFFICE/OUTPT VISIT, EST, LEVL IV, 30-39 MIN: ICD-10-PCS | Mod: S$GLB,,, | Performed by: FAMILY MEDICINE

## 2023-06-06 PROCEDURE — 99214 OFFICE O/P EST MOD 30 MIN: CPT | Mod: S$GLB,,, | Performed by: FAMILY MEDICINE

## 2023-06-06 RX ORDER — LEVOTHYROXINE SODIUM 25 UG/1
25 TABLET ORAL
Qty: 90 TABLET | Refills: 3 | Status: SHIPPED | OUTPATIENT
Start: 2023-06-06 | End: 2023-12-07 | Stop reason: SDUPTHER

## 2023-06-06 RX ORDER — AMLODIPINE BESYLATE 5 MG/1
5 TABLET ORAL DAILY
Qty: 90 TABLET | Refills: 3 | Status: CANCELLED | OUTPATIENT
Start: 2023-06-06 | End: 2023-12-03

## 2023-06-06 RX ORDER — OMEPRAZOLE 40 MG/1
40 CAPSULE, DELAYED RELEASE ORAL DAILY
Qty: 90 CAPSULE | Refills: 3 | Status: SHIPPED | OUTPATIENT
Start: 2023-06-06 | End: 2023-12-07 | Stop reason: SDUPTHER

## 2023-06-06 RX ORDER — ROSUVASTATIN CALCIUM 5 MG/1
5 TABLET, COATED ORAL DAILY
Qty: 90 TABLET | Refills: 3 | Status: SHIPPED | OUTPATIENT
Start: 2023-06-06 | End: 2023-12-07 | Stop reason: SDUPTHER

## 2023-06-06 RX ORDER — RALOXIFENE HYDROCHLORIDE 60 MG/1
60 TABLET, FILM COATED ORAL DAILY
Qty: 90 TABLET | Refills: 3 | Status: SHIPPED | OUTPATIENT
Start: 2023-06-06 | End: 2023-12-07 | Stop reason: SDUPTHER

## 2023-06-06 NOTE — PROGRESS NOTES
"  SUBJECTIVE:    Patient ID: Sophia Alberto is a 72 y.o. female.    Chief Complaint: Hypertension (Brought bottles, need refills, review Lab-results, right knee pain, discuss about colonoscopy, IBS problem, declined Dexa, abc )    72-year-old female here for six-month visit.  "I am not feeling right" at times her heart is racing, some anxiety.  Decreased sleep at times.    Stays active by doing yd work at home.  No extra exercise regimen however.    Hiatal hernia, feels bloated and gassy in the abdomen after certain meals; some left lower quadrant abdominal pains intermittently from her diverticula.  Has good bowel movements with nightly doses of Senokot.    Osteoarthritis, right knee aches intermittently, low-dose ibuprofen helps, no limping.  SHe tried blue emu cream without good success    2014-colonoscopy with Dr. Avilez-C 10 years      No visits with results within 6 Month(s) from this visit.   Latest known visit with results is:   Office Visit on 12/06/2022   Component Date Value Ref Range Status    WBC 06/01/2023 9.0  3.8 - 10.8 Thousand/uL Final    RBC 06/01/2023 4.80  3.80 - 5.10 Million/uL Final    Hemoglobin 06/01/2023 15.0  11.7 - 15.5 g/dL Final    Hematocrit 06/01/2023 44.9  35.0 - 45.0 % Final    MCV 06/01/2023 93.5  80.0 - 100.0 fL Final    MCH 06/01/2023 31.3  27.0 - 33.0 pg Final    MCHC 06/01/2023 33.4  32.0 - 36.0 g/dL Final    RDW 06/01/2023 12.7  11.0 - 15.0 % Final    Platelets 06/01/2023 235  140 - 400 Thousand/uL Final    MPV 06/01/2023 10.4  7.5 - 12.5 fL Final    Neutrophils, Abs 06/01/2023 6,138  1,500 - 7,800 cells/uL Final    Lymph # 06/01/2023 2,061  850 - 3,900 cells/uL Final    Mono # 06/01/2023 594  200 - 950 cells/uL Final    Eos # 06/01/2023 153  15 - 500 cells/uL Final    Baso # 06/01/2023 54  0 - 200 cells/uL Final    Neutrophils Relative 06/01/2023 68.2  % Final    Lymph % 06/01/2023 22.9  % Final    Mono % 06/01/2023 6.6  % Final    Eosinophil % 06/01/2023 1.7  % Final "    Basophil % 06/01/2023 0.6  % Final    Glucose 06/01/2023 108 (H)  65 - 99 mg/dL Final    BUN 06/01/2023 14  7 - 25 mg/dL Final    Creatinine 06/01/2023 0.68  0.60 - 1.00 mg/dL Final    eGFR 06/01/2023 92  > OR = 60 mL/min/1.73m2 Final    BUN/Creatinine Ratio 06/01/2023 NOT APPLICABLE  6 - 22 (calc) Final    Sodium 06/01/2023 142  135 - 146 mmol/L Final    Potassium 06/01/2023 4.3  3.5 - 5.3 mmol/L Final    Chloride 06/01/2023 105  98 - 110 mmol/L Final    CO2 06/01/2023 29  20 - 32 mmol/L Final    Calcium 06/01/2023 8.7  8.6 - 10.4 mg/dL Final    Total Protein 06/01/2023 6.3  6.1 - 8.1 g/dL Final    Albumin 06/01/2023 4.0  3.6 - 5.1 g/dL Final    Globulin, Total 06/01/2023 2.3  1.9 - 3.7 g/dL (calc) Final    Albumin/Globulin Ratio 06/01/2023 1.7  1.0 - 2.5 (calc) Final    Total Bilirubin 06/01/2023 0.5  0.2 - 1.2 mg/dL Final    Alkaline Phosphatase 06/01/2023 73  37 - 153 U/L Final    AST 06/01/2023 21  10 - 35 U/L Final    ALT 06/01/2023 21  6 - 29 U/L Final    Cholesterol 06/01/2023 159  <200 mg/dL Final    HDL 06/01/2023 66  > OR = 50 mg/dL Final    Triglycerides 06/01/2023 138  <150 mg/dL Final    LDL Cholesterol 06/01/2023 71  mg/dL (calc) Final    HDL/Cholesterol Ratio 06/01/2023 2.4  <5.0 (calc) Final    Non HDL Chol. (LDL+VLDL) 06/01/2023 93  <130 mg/dL (calc) Final    TSH w/reflex to FT4 06/01/2023 2.07  0.40 - 4.50 mIU/L Final       Past Medical History:   Diagnosis Date    Diverticulosis     History of nuclear stress test 2015    Normal    Hyperlipidemia     Hypertension     Mitral valve prolapse      Social History     Socioeconomic History    Marital status:    Tobacco Use    Smoking status: Every Day     Types: Vaping with nicotine    Smokeless tobacco: Never   Substance and Sexual Activity    Drug use: Never     Past Surgical History:   Procedure Laterality Date    CHOLECYSTECTOMY      COLONOSCOPY  2014    Dr. Avilez RTC 10 years    HYSTERECTOMY      LAPAROSCOPY       Family History    Problem Relation Age of Onset    Diabetes Mother     Heart disease Mother     Cancer Father     Breast cancer Maternal Grandmother        Review of patient's allergies indicates:   Allergen Reactions    Meperidine     Penicillins        Current Outpatient Medications:     amLODIPine (NORVASC) 5 MG tablet, Take 1 tablet (5 mg total) by mouth once daily., Disp: 90 tablet, Rfl: 1    aspirin (ECOTRIN) 81 MG EC tablet, Take 81 mg by mouth once daily., Disp: , Rfl:     ciprofloxacin HCl (CIPRO) 500 MG tablet, Take 1 tablet (500 mg total) by mouth 2 (two) times daily. (Patient not taking: Reported on 12/6/2022), Disp: 20 tablet, Rfl: 0    levothyroxine (SYNTHROID) 25 MCG tablet, Take 1 tablet (25 mcg total) by mouth before breakfast., Disp: 90 tablet, Rfl: 3    metroNIDAZOLE (FLAGYL) 500 MG tablet, Take 1 tablet (500 mg total) by mouth 3 (three) times daily. (Patient not taking: Reported on 12/6/2022), Disp: 30 tablet, Rfl: 0    omeprazole (PRILOSEC) 40 MG capsule, Take 1 capsule (40 mg total) by mouth once daily., Disp: 90 capsule, Rfl: 3    raloxifene (EVISTA) 60 mg tablet, Take 1 tablet (60 mg total) by mouth once daily., Disp: 90 tablet, Rfl: 3    rosuvastatin (CRESTOR) 5 MG tablet, Take 1 tablet (5 mg total) by mouth once daily., Disp: 90 tablet, Rfl: 3    Review of Systems   Constitutional:  Negative for appetite change, chills, fatigue, fever and unexpected weight change.   HENT:  Negative for ear discharge and ear pain.    Eyes:  Negative for pain, discharge and visual disturbance.   Respiratory:  Negative for apnea, cough, shortness of breath and wheezing.    Cardiovascular:  Negative for chest pain, palpitations and leg swelling.   Gastrointestinal:  Positive for abdominal distention, abdominal pain (Left lower quadrant abdominal discomfort) and constipation. Negative for blood in stool, diarrhea, nausea, vomiting and reflux.   Endocrine: Negative for cold intolerance, heat intolerance and polydipsia.  "  Genitourinary:  Negative for bladder incontinence, dysuria, hematuria, nocturia and urgency.   Musculoskeletal:  Positive for arthralgias (right knee pains intermittently). Negative for gait problem, joint swelling and myalgias.   Neurological:  Negative for dizziness, seizures and numbness.   Psychiatric/Behavioral:  Positive for sleep disturbance. Negative for behavioral problems and hallucinations. The patient is nervous/anxious.          Objective:      Vitals:    06/06/23 1051   BP: 128/80   Pulse: 99   Weight: 56.2 kg (124 lb)   Height: 4' 11" (1.499 m)     Physical Exam  Vitals and nursing note reviewed.   Constitutional:       General: She is not in acute distress.     Appearance: Normal appearance. She is well-developed. She is not toxic-appearing.   HENT:      Head: Normocephalic and atraumatic.      Right Ear: Tympanic membrane and external ear normal.      Left Ear: Tympanic membrane and external ear normal.      Nose: Nose normal.      Mouth/Throat:      Pharynx: Oropharynx is clear.   Eyes:      Pupils: Pupils are equal, round, and reactive to light.   Neck:      Thyroid: No thyromegaly.      Vascular: No carotid bruit.   Cardiovascular:      Rate and Rhythm: Normal rate and regular rhythm.      Heart sounds: Normal heart sounds. No murmur heard.  Pulmonary:      Effort: Pulmonary effort is normal.      Breath sounds: Normal breath sounds. No wheezing or rales.   Abdominal:      General: Bowel sounds are normal. There is no distension.      Palpations: Abdomen is soft.      Tenderness: There is no abdominal tenderness.   Musculoskeletal:         General: No tenderness or deformity. Normal range of motion.      Cervical back: Normal range of motion and neck supple.      Lumbar back: Normal. No spasms.      Comments: Bends 90 degrees at  waist, shoulders have good range of motion, right knee is crepitant greater than left knee.  No major joint swelling noted.  No pitting edema to lower extremities "   Lymphadenopathy:      Cervical: No cervical adenopathy.   Skin:     General: Skin is warm and dry.      Findings: No rash.   Neurological:      General: No focal deficit present.      Mental Status: She is alert and oriented to person, place, and time. Mental status is at baseline.      Cranial Nerves: No cranial nerve deficit.      Coordination: Coordination normal.   Psychiatric:         Behavior: Behavior normal.         Thought Content: Thought content normal.         Judgment: Judgment normal.         Assessment:       1. Arthritis of knee    2. Essential hypertension    3. Acquired hypothyroidism    4. Gastroesophageal reflux disease without esophagitis    5. Adult idiopathic generalized osteoporosis    6. Pure hypercholesterolemia    7. Screening mammogram for high-risk patient    8. Special screening for malignant neoplasms, colon    9. MVP (mitral valve prolapse)    10. Palpitations    11. Primary hypertension    12. Diverticulosis of large intestine without perforation or abscess without bleeding    13. Slow transit constipation    14. Impingement syndrome of left shoulder    15. Cigarette nicotine dependence without complication    16. Other emphysema         Plan:       Arthritis of knee  Continue low-dose ibuprofen.  Consider knee support brace  Essential hypertension  Blood pressure well controlled  Acquired hypothyroidism  -     levothyroxine (SYNTHROID) 25 MCG tablet; Take 1 tablet (25 mcg total) by mouth before breakfast.  Dispense: 90 tablet; Refill: 3    Gastroesophageal reflux disease without esophagitis  -     omeprazole (PRILOSEC) 40 MG capsule; Take 1 capsule (40 mg total) by mouth once daily.  Dispense: 90 capsule; Refill: 3  Continue omeprazole  Adult idiopathic generalized osteoporosis  -     raloxifene (EVISTA) 60 mg tablet; Take 1 tablet (60 mg total) by mouth once daily.  Dispense: 90 tablet; Refill: 3    Pure hypercholesterolemia  -     rosuvastatin (CRESTOR) 5 MG tablet; Take 1  tablet (5 mg total) by mouth once daily.  Dispense: 90 tablet; Refill: 3  Cholesterol 159 HDL 66  LDL 71  Screening mammogram for high-risk patient  -     Mammo Digital Screening Bilat; Future; Expected date: 06/14/2023  Mammogram ordered for late June  Special screening for malignant neoplasms, colon  -     Ambulatory referral/consult to Gastroenterology; Future; Expected date: 06/13/2023  Refer to Dr. Avilez for EGD and colonoscopy.  MVP (mitral valve prolapse)    Palpitations    Primary hypertension    Diverticulosis of large intestine without perforation or abscess without bleeding    Slow transit constipation    Impingement syndrome of left shoulder    Cigarette nicotine dependence without complication    Other emphysema  Patient declines all vaccines today    Follow up in about 6 months (around 12/6/2023), or gerd, knee OA.        6/6/2023 Deacon Davies

## 2023-06-07 DIAGNOSIS — I10 ESSENTIAL HYPERTENSION: ICD-10-CM

## 2023-06-07 RX ORDER — AMLODIPINE BESYLATE 5 MG/1
5 TABLET ORAL DAILY
Qty: 14 TABLET | Refills: 0 | Status: SHIPPED | OUTPATIENT
Start: 2023-06-07 | End: 2023-12-07 | Stop reason: SDUPTHER

## 2023-06-07 RX ORDER — AMLODIPINE BESYLATE 5 MG/1
5 TABLET ORAL DAILY
Qty: 90 TABLET | Refills: 3 | Status: SHIPPED | OUTPATIENT
Start: 2023-06-07 | End: 2023-07-31

## 2023-06-07 NOTE — TELEPHONE ENCOUNTER
Spoke with pt in regards to message sent. Pt states [facility] Davies cancel her amlodipine. Verbalized to the pt that it looks like the prescription went to the wrong pharmacy. Will set up a 2 week supply to pt's local pharmacy and send a 90 day supply to her mail delivery. Pt acknowledged understanding.

## 2023-06-07 NOTE — TELEPHONE ENCOUNTER
----- Message from Kalin Orozco MA sent at 6/7/2023  2:50 PM CDT -----  Regarding: med issue  [facility]  [facility] her [facility]ine and she would like to know what is going on. Pt states he has 4 or 5 days worth of amlodipine. [phone]

## 2023-06-19 LAB — HM MAMMOGRAM: NORMAL

## 2023-06-23 ENCOUNTER — TELEPHONE (OUTPATIENT)
Dept: FAMILY MEDICINE | Facility: CLINIC | Age: 73
End: 2023-06-23

## 2023-07-31 ENCOUNTER — TELEPHONE (OUTPATIENT)
Dept: FAMILY MEDICINE | Facility: CLINIC | Age: 73
End: 2023-07-31

## 2023-07-31 ENCOUNTER — OFFICE VISIT (OUTPATIENT)
Dept: FAMILY MEDICINE | Facility: CLINIC | Age: 73
End: 2023-07-31
Payer: MEDICARE

## 2023-07-31 VITALS
HEART RATE: 84 BPM | SYSTOLIC BLOOD PRESSURE: 100 MMHG | TEMPERATURE: 98 F | BODY MASS INDEX: 25.36 KG/M2 | HEIGHT: 59 IN | WEIGHT: 125.81 LBS | DIASTOLIC BLOOD PRESSURE: 70 MMHG | OXYGEN SATURATION: 97 %

## 2023-07-31 DIAGNOSIS — R10.33 PERIUMBILICAL ABDOMINAL PAIN: ICD-10-CM

## 2023-07-31 DIAGNOSIS — R39.9 UTI SYMPTOMS: Primary | ICD-10-CM

## 2023-07-31 DIAGNOSIS — K59.01 SLOW TRANSIT CONSTIPATION: ICD-10-CM

## 2023-07-31 DIAGNOSIS — K57.30 DIVERTICULOSIS OF LARGE INTESTINE WITHOUT PERFORATION OR ABSCESS WITHOUT BLEEDING: ICD-10-CM

## 2023-07-31 PROCEDURE — 99213 OFFICE O/P EST LOW 20 MIN: CPT | Mod: S$GLB,,,

## 2023-07-31 PROCEDURE — 99213 PR OFFICE/OUTPT VISIT, EST, LEVL III, 20-29 MIN: ICD-10-PCS | Mod: S$GLB,,,

## 2023-07-31 PROCEDURE — 81003 URINALYSIS AUTO W/O SCOPE: CPT | Mod: QW,S$GLB,,

## 2023-07-31 PROCEDURE — 81003 POCT URINALYSIS, DIPSTICK, AUTOMATED, W/O SCOPE: ICD-10-PCS | Mod: QW,S$GLB,,

## 2023-07-31 RX ORDER — MOXIFLOXACIN HYDROCHLORIDE 400 MG/1
400 TABLET ORAL DAILY
Qty: 7 TABLET | Refills: 0 | Status: SHIPPED | OUTPATIENT
Start: 2023-07-31 | End: 2023-07-31 | Stop reason: SDUPTHER

## 2023-07-31 RX ORDER — MOXIFLOXACIN HYDROCHLORIDE 400 MG/1
400 TABLET ORAL DAILY
Qty: 7 TABLET | Refills: 0 | Status: SHIPPED | OUTPATIENT
Start: 2023-07-31 | End: 2023-08-07

## 2023-07-31 NOTE — TELEPHONE ENCOUNTER
Spoke with patient states she got xray done and would like rx sent to [facility] on [address]. Patient stated she thought rx would be sent after xray results. Informed if patient has diverticulitis it is best to start on medication. Verbalized understanding.     Pended to Brandy

## 2023-07-31 NOTE — TELEPHONE ENCOUNTER
----- Message from Mayra Edwards sent at 7/31/2023 11:22 AM CDT -----  PT asked to not have her new prescription sent to the [facility]. She asked if it was can be sent to [facility] on [address]. But, she would like to speak about what actual medications that she will be put on, due to the fact that the meds that are listed on her end of visit summary is not the same meds that they spoke about (her words).  [phone]

## 2023-07-31 NOTE — TELEPHONE ENCOUNTER
----- Message from Nickie Cornejo LPN sent at 7/31/2023  8:35 AM CDT -----    ----- Message -----  From: Hellen Perez  Sent: 7/31/2023   8:32 AM CDT  To: [facility] Staff    Pt wants to be seen today. Possible UTI. Pt #[phone]

## 2023-07-31 NOTE — PROGRESS NOTES
SUBJECTIVE:    Patient ID: Sophia Alberto is a 72 y.o. female.    Chief Complaint: Urinary Tract Infection (UTI symptoms x 5-6 days discomfort lower abd not when urinating/ feels jittery/ no fever/ no frequency urgency or burning/ feels worse when sitting and after eats/ headaches come and go/ sinus pressure ongoing several months/states in March hit head after cleaning bathroom no loss of consciousness had knot on left side of head//mp/)    72 year old female presents to clinic today with complaints of pain in her lower abdomen, which she thinks could be a UTI. She denies burning or pain with urination, blood, frequency, urgency. She also denies constipation, and states she has been having normal Bms. She denies any watery or loose stool. States it has been formed and soft. Denies decreased appetite, nausea or vomiting. States the pain feels like a general discomfort in the area of her belly button or just under it. She is unsure. States it has been going on for 4-5 days.   Patient does have a history of diverticulosis which she has been treated for symptoms of developing diverticulitis in the past. She was treated with a combo of cipro and flagyl, but patient states she ended up stopping the medications because they were making her nauseated. This was in December. Denies chills or fever.     Urinary Tract Infection   Pertinent negatives include no chills, frequency, hematuria, nausea, urgency, vomiting or constipation.       Office Visit on 07/31/2023   Component Date Value Ref Range Status    POC Blood, Urine 07/31/2023 Negative  Negative Final    POC Bilirubin, Urine 07/31/2023 Negative  Negative Final    POC Urobilinogen, Urine 07/31/2023 0.2  0.1 - 1.1 Final    POC Ketones, Urine 07/31/2023 Negative  Negative Final    POC Protein, Urine 07/31/2023 Negative  Negative Final    POC Nitrates, Urine 07/31/2023 Negative  Negative Final    POC Glucose, Urine 07/31/2023 Negative  Negative Final    pH, UA  07/31/2023 6.0   Final    POC Specific Gravity, Urine 07/31/2023 1.010  1.003 - 1.029 Final    POC Leukocytes, Urine 07/31/2023 Negative  Negative Final   Office Visit on 06/06/2023   Component Date Value Ref Range Status    HM Mammogram 06/19/2023 Normal   Final       Past Medical History:   Diagnosis Date    Diverticulosis     History of nuclear stress test 2015    Normal    Hyperlipidemia     Hypertension     Mitral valve prolapse      Social History     Socioeconomic History    Marital status:    Tobacco Use    Smoking status: Every Day     Current packs/day: 0.00     Types: Vaping with nicotine    Smokeless tobacco: Never   Substance and Sexual Activity    Drug use: Never     Past Surgical History:   Procedure Laterality Date    CHOLECYSTECTOMY      COLONOSCOPY  2014    Dr. Avilez RTC 10 years    HYSTERECTOMY      LAPAROSCOPY       Family History   Problem Relation Age of Onset    Diabetes Mother     Heart disease Mother     Cancer Father     Breast cancer Maternal Grandmother        Review of patient's allergies indicates:   Allergen Reactions    Meperidine     Penicillins        Current Outpatient Medications:     amLODIPine (NORVASC) 5 MG tablet, Take 1 tablet (5 mg total) by mouth once daily., Disp: 90 tablet, Rfl: 3    aspirin (ECOTRIN) 81 MG EC tablet, Take 81 mg by mouth once daily., Disp: , Rfl:     levothyroxine (SYNTHROID) 25 MCG tablet, Take 1 tablet (25 mcg total) by mouth before breakfast., Disp: 90 tablet, Rfl: 3    omeprazole (PRILOSEC) 40 MG capsule, Take 1 capsule (40 mg total) by mouth once daily., Disp: 90 capsule, Rfl: 3    raloxifene (EVISTA) 60 mg tablet, Take 1 tablet (60 mg total) by mouth once daily., Disp: 90 tablet, Rfl: 3    rosuvastatin (CRESTOR) 5 MG tablet, Take 1 tablet (5 mg total) by mouth once daily., Disp: 90 tablet, Rfl: 3    amLODIPine (NORVASC) 5 MG tablet, Take 1 tablet (5 mg total) by mouth once daily., Disp: 14 tablet, Rfl: 0    metroNIDAZOLE (FLAGYL) 500 MG  "tablet, Take 1 tablet (500 mg total) by mouth 3 (three) times daily. (Patient not taking: Reported on 12/6/2022), Disp: 30 tablet, Rfl: 0    moxifloxacin (AVELOX) 400 mg tablet, Take 1 tablet (400 mg total) by mouth once daily. for 7 days, Disp: 7 tablet, Rfl: 0    Review of Systems   Constitutional:  Negative for appetite change, chills, fatigue, fever and unexpected weight change.   Respiratory:  Negative for cough, shortness of breath and wheezing.    Cardiovascular:  Negative for chest pain, palpitations and leg swelling.   Gastrointestinal:  Positive for abdominal pain. Negative for change in bowel habit, constipation, diarrhea, nausea, vomiting, reflux and change in bowel habit.   Genitourinary:  Negative for difficulty urinating, dysuria, frequency, hematuria and urgency.           Objective:      Vitals:    07/31/23 1022   BP: 100/70   Pulse: 84   Temp: 97.9 °F (36.6 °C)   SpO2: 97%   Weight: 57.1 kg (125 lb 12.8 oz)   Height: 4' 11" (1.499 m)     Physical Exam  Constitutional:       General: She is not in acute distress.     Appearance: Normal appearance. She is normal weight. She is not ill-appearing.   HENT:      Head: Normocephalic and atraumatic.      Mouth/Throat:      Mouth: Mucous membranes are moist.   Eyes:      General:         Right eye: No discharge.         Left eye: No discharge.   Neck:      Vascular: No carotid bruit.   Cardiovascular:      Rate and Rhythm: Normal rate and regular rhythm.      Pulses: Normal pulses.      Heart sounds: Normal heart sounds. No murmur heard.  Pulmonary:      Effort: Pulmonary effort is normal.      Breath sounds: Normal breath sounds.   Abdominal:      General: Abdomen is flat. Bowel sounds are normal. There is no distension.      Palpations: Abdomen is soft. There is no mass.      Tenderness: There is abdominal tenderness. There is no guarding or rebound.      Hernia: No hernia is present.      Comments: TTP to periumbilical region, especially above and to the " "left.   Musculoskeletal:      Right lower leg: No edema.      Left lower leg: No edema.   Lymphadenopathy:      Cervical: No cervical adenopathy.   Skin:     General: Skin is warm and dry.      Capillary Refill: Capillary refill takes less than 2 seconds.      Coloration: Skin is not jaundiced.   Neurological:      General: No focal deficit present.      Mental Status: She is alert and oriented to person, place, and time.   Psychiatric:         Mood and Affect: Mood normal.           Assessment:       1. UTI symptoms    2. Diverticulosis of large intestine without perforation or abscess without bleeding    3. Slow transit constipation    4. Periumbilical abdominal pain         Plan:       UTI symptoms  Patient's "symptom" is vague abdominal discomfort, which she feels like is located just below her belly button. UA shows no signs of acute infection. Culture to rule out bacteuria  -     POCT Urinalysis, Dipstick, Automated, W/O Scope  -     Urine culture; Future; Expected date: 07/31/2023    Diverticulosis of large intestine without perforation or abscess without bleeding  Patient does have a history of diverticulosis which she has been treated for symptoms of developing diverticulitis in the past. She was treated with a combo of cipro and flagyl, but patient states she ended up stopping the medications because they were making her nauseated. This was in December. Rule out diverticulitis  -     X-Ray Abdomen Flat And Erect; Future; Expected date: 07/31/2023    Slow transit constipation  Patient states she has not been having any problems with constipation and has been having normal Bms. Rule out impaction/obstruction.  -     X-Ray Abdomen Flat And Erect; Future; Expected date: 07/31/2023    Periumbilical abdominal pain  -     Urine culture; Future; Expected date: 07/31/2023  -     X-Ray Abdomen Flat And Erect; Future; Expected date: 07/31/2023      Follow up if symptoms worsen or fail to improve.        7/31/2023 " Serene Diaz

## 2023-08-01 ENCOUNTER — TELEPHONE (OUTPATIENT)
Dept: FAMILY MEDICINE | Facility: CLINIC | Age: 73
End: 2023-08-01

## 2023-08-01 NOTE — TELEPHONE ENCOUNTER
----- Message from Trudi Hopper sent at 8/1/2023 11:12 AM CDT -----  Patient called and stated that she was sent to have an xray yesterday by Serene and she wanted to know if her results were in ? Please give her a call and advise at [phone]

## 2023-08-01 NOTE — TELEPHONE ENCOUNTER
I just reviewed. There are no abnormal findings, and nothing to explain her discomfort. I want her to begin taking the antibiotic, as I am more concerned now that this could be some inflammation (diverticulitis) from her history of diverticulosis.

## 2023-08-01 NOTE — TELEPHONE ENCOUNTER
GEO Arroyo-CNP  You 4 hours ago (11:55 AM)     JS  I just reviewed. There are no abnormal findings, and nothing to explain her discomfort. I want her to begin taking the antibiotic, as I am more concerned now that this could be some inflammation (diverticulitis) from her history of diverticulosis.          Spoke with patient and let her know the above per Serene - she will take the abx and will keep us updated.

## 2023-08-02 LAB — BACTERIA UR CULT: NORMAL

## 2023-09-20 DIAGNOSIS — Z78.0 ASYMPTOMATIC MENOPAUSAL STATE: ICD-10-CM

## 2023-10-06 ENCOUNTER — TELEPHONE (OUTPATIENT)
Dept: FAMILY MEDICINE | Facility: CLINIC | Age: 73
End: 2023-10-06

## 2023-10-06 DIAGNOSIS — J31.2 CHRONIC SORE THROAT: Primary | ICD-10-CM

## 2023-10-06 NOTE — TELEPHONE ENCOUNTER
----- Message from Makeda Cody sent at 10/6/2023 10:05 AM CDT -----  The patient called this morning. The nurse called her back to find out some more information. She is calling back to see if she can be seen. Pt's # [phone] [facility] [address].

## 2023-10-06 NOTE — TELEPHONE ENCOUNTER
Spoke with pt states within the last few month she has had a sore throat. States she is concerned of throat cancer. Thinks this because she is having headaches, sore throat. ENT?

## 2023-10-06 NOTE — TELEPHONE ENCOUNTER
Does not sound urgent if it has been going on for a couple months.  Agree that she could be evaluated better with ENT

## 2023-10-06 NOTE — TELEPHONE ENCOUNTER
----- Message from Serene Britton sent at 10/6/2023  8:31 AM CDT -----  Pt would like to be seen today. She has been dealing with a sore throat for months. Just not feeling right   [phone]

## 2023-11-01 DIAGNOSIS — R10.32 LLQ PAIN: ICD-10-CM

## 2023-11-01 DIAGNOSIS — R68.81 EARLY SATIETY: Primary | ICD-10-CM

## 2023-11-01 LAB — CRC RECOMMENDATION EXT: NORMAL

## 2023-11-02 ENCOUNTER — PATIENT OUTREACH (OUTPATIENT)
Dept: ADMINISTRATIVE | Facility: HOSPITAL | Age: 73
End: 2023-11-02

## 2023-11-21 ENCOUNTER — TELEPHONE (OUTPATIENT)
Dept: FAMILY MEDICINE | Facility: CLINIC | Age: 73
End: 2023-11-21

## 2023-11-21 DIAGNOSIS — I10 ESSENTIAL HYPERTENSION: ICD-10-CM

## 2023-11-21 DIAGNOSIS — E78.00 PURE HYPERCHOLESTEROLEMIA: ICD-10-CM

## 2023-11-21 DIAGNOSIS — Z79.899 ENCOUNTER FOR LONG-TERM (CURRENT) USE OF OTHER MEDICATIONS: Primary | ICD-10-CM

## 2023-11-21 DIAGNOSIS — E03.9 ACQUIRED HYPOTHYROIDISM: ICD-10-CM

## 2023-11-21 NOTE — TELEPHONE ENCOUNTER
Left message that fasting lab and urine are due a week prior to visit, orders at [facility], encouraged water, advised she can take morning meds that do not need to be taken with food.

## 2023-12-01 LAB
ALBUMIN SERPL-MCNC: 4.3 G/DL (ref 3.6–5.1)
ALBUMIN/CREAT UR: 5 MCG/MG CREAT
ALBUMIN/GLOB SERPL: 2 (CALC) (ref 1–2.5)
ALP SERPL-CCNC: 75 U/L (ref 37–153)
ALT SERPL-CCNC: 23 U/L (ref 6–29)
APPEARANCE UR: ABNORMAL
AST SERPL-CCNC: 20 U/L (ref 10–35)
BACTERIA #/AREA URNS HPF: ABNORMAL /HPF
BACTERIA UR CULT: ABNORMAL
BILIRUB SERPL-MCNC: 0.5 MG/DL (ref 0.2–1.2)
BILIRUB UR QL STRIP: NEGATIVE
BUN SERPL-MCNC: 13 MG/DL (ref 7–25)
BUN/CREAT SERPL: ABNORMAL (CALC) (ref 6–22)
CALCIUM SERPL-MCNC: 9 MG/DL (ref 8.6–10.4)
CHLORIDE SERPL-SCNC: 107 MMOL/L (ref 98–110)
CHOLEST SERPL-MCNC: 158 MG/DL
CHOLEST/HDLC SERPL: 2.3 (CALC)
CO2 SERPL-SCNC: 30 MMOL/L (ref 20–32)
COLOR UR: YELLOW
CREAT SERPL-MCNC: 0.66 MG/DL (ref 0.6–1)
CREAT UR-MCNC: 56 MG/DL (ref 20–275)
EGFR: 92 ML/MIN/1.73M2
GLOBULIN SER CALC-MCNC: 2.1 G/DL (CALC) (ref 1.9–3.7)
GLUCOSE SERPL-MCNC: 114 MG/DL (ref 65–99)
GLUCOSE UR QL STRIP: NEGATIVE
HDLC SERPL-MCNC: 70 MG/DL
HGB UR QL STRIP: NEGATIVE
HYALINE CASTS #/AREA URNS LPF: ABNORMAL /LPF
KETONES UR QL STRIP: NEGATIVE
LDLC SERPL CALC-MCNC: 68 MG/DL (CALC)
LEUKOCYTE ESTERASE UR QL STRIP: NEGATIVE
MICROALBUMIN UR-MCNC: 0.3 MG/DL
NITRITE UR QL STRIP: NEGATIVE
NONHDLC SERPL-MCNC: 88 MG/DL (CALC)
PH UR STRIP: 7 [PH] (ref 5–8)
POTASSIUM SERPL-SCNC: 4.1 MMOL/L (ref 3.5–5.3)
PROT SERPL-MCNC: 6.4 G/DL (ref 6.1–8.1)
PROT UR QL STRIP: NEGATIVE
RBC #/AREA URNS HPF: ABNORMAL /HPF
SERVICE CMNT-IMP: ABNORMAL
SODIUM SERPL-SCNC: 144 MMOL/L (ref 135–146)
SP GR UR STRIP: 1.01 (ref 1–1.03)
SQUAMOUS #/AREA URNS HPF: ABNORMAL /HPF
TRIGL SERPL-MCNC: 113 MG/DL
TSH SERPL-ACNC: 1.87 MIU/L (ref 0.4–4.5)
WBC #/AREA URNS HPF: ABNORMAL /HPF

## 2023-12-07 ENCOUNTER — OFFICE VISIT (OUTPATIENT)
Dept: FAMILY MEDICINE | Facility: CLINIC | Age: 73
End: 2023-12-07
Attending: FAMILY MEDICINE
Payer: MEDICARE

## 2023-12-07 VITALS
DIASTOLIC BLOOD PRESSURE: 70 MMHG | HEIGHT: 59 IN | BODY MASS INDEX: 24.8 KG/M2 | WEIGHT: 123 LBS | HEART RATE: 93 BPM | SYSTOLIC BLOOD PRESSURE: 120 MMHG

## 2023-12-07 DIAGNOSIS — J43.8 OTHER EMPHYSEMA: ICD-10-CM

## 2023-12-07 DIAGNOSIS — E03.9 ACQUIRED HYPOTHYROIDISM: ICD-10-CM

## 2023-12-07 DIAGNOSIS — K59.01 SLOW TRANSIT CONSTIPATION: ICD-10-CM

## 2023-12-07 DIAGNOSIS — K21.9 GASTROESOPHAGEAL REFLUX DISEASE WITHOUT ESOPHAGITIS: ICD-10-CM

## 2023-12-07 DIAGNOSIS — R00.2 PALPITATIONS: ICD-10-CM

## 2023-12-07 DIAGNOSIS — E78.00 PURE HYPERCHOLESTEROLEMIA: ICD-10-CM

## 2023-12-07 DIAGNOSIS — I34.1 MVP (MITRAL VALVE PROLAPSE): ICD-10-CM

## 2023-12-07 DIAGNOSIS — I10 PRIMARY HYPERTENSION: ICD-10-CM

## 2023-12-07 DIAGNOSIS — M81.8 ADULT IDIOPATHIC GENERALIZED OSTEOPOROSIS: ICD-10-CM

## 2023-12-07 DIAGNOSIS — F17.210 CIGARETTE NICOTINE DEPENDENCE WITHOUT COMPLICATION: ICD-10-CM

## 2023-12-07 DIAGNOSIS — K57.30 DIVERTICULOSIS OF LARGE INTESTINE WITHOUT PERFORATION OR ABSCESS WITHOUT BLEEDING: ICD-10-CM

## 2023-12-07 DIAGNOSIS — I10 ESSENTIAL HYPERTENSION: Primary | ICD-10-CM

## 2023-12-07 PROCEDURE — 99214 PR OFFICE/OUTPT VISIT, EST, LEVL IV, 30-39 MIN: ICD-10-PCS | Mod: S$GLB,,, | Performed by: FAMILY MEDICINE

## 2023-12-07 PROCEDURE — 99214 OFFICE O/P EST MOD 30 MIN: CPT | Mod: S$GLB,,, | Performed by: FAMILY MEDICINE

## 2023-12-07 RX ORDER — AMLODIPINE BESYLATE 5 MG/1
5 TABLET ORAL DAILY
Qty: 90 TABLET | Refills: 3 | Status: SHIPPED | OUTPATIENT
Start: 2023-12-07

## 2023-12-07 RX ORDER — LEVOTHYROXINE SODIUM 25 UG/1
25 TABLET ORAL
Qty: 90 TABLET | Refills: 3 | Status: SHIPPED | OUTPATIENT
Start: 2023-12-07 | End: 2024-12-06

## 2023-12-07 RX ORDER — RALOXIFENE HYDROCHLORIDE 60 MG/1
60 TABLET, FILM COATED ORAL DAILY
Qty: 90 TABLET | Refills: 3 | Status: SHIPPED | OUTPATIENT
Start: 2023-12-07

## 2023-12-07 RX ORDER — OMEPRAZOLE 40 MG/1
40 CAPSULE, DELAYED RELEASE ORAL DAILY
Qty: 90 CAPSULE | Refills: 3 | Status: SHIPPED | OUTPATIENT
Start: 2023-12-07

## 2023-12-07 RX ORDER — ROSUVASTATIN CALCIUM 5 MG/1
5 TABLET, COATED ORAL DAILY
Qty: 90 TABLET | Refills: 3 | Status: SHIPPED | OUTPATIENT
Start: 2023-12-07

## 2023-12-07 NOTE — PROGRESS NOTES
SUBJECTIVE:    Patient ID: Sophia Alberto is a 73 y.o. female.    Chief Complaint: Hypothyroidism (Went over meds verbally, review Lab-results, brought bottles, need refills, abc)    73-year-old female here for six-month checkup.  She is feeling very healthy at this time and has no major complaints.  She does not exercise, she does smoke a vape.    October 2023-colonoscopy with Dr. Avilez-no RTC necessary, EGD done-all normal    Dr. Gaytan-ENT-allergic sinuses, Astelin spray/Nasacort/saline spray    Right toe has muscle spasms and Will splay out laterally intermittently.  Right knee aches occasionally.  Not  Requiring ibuprofen.  June 2023-mammogram normal    Vascular surgeon Dr. Cortes Doe-following a 70% common iliac artery stenosis.  Is asymptomatic and she can ambulate well without claudication.  No intervention planned yet.    Hyperlipidemia-doing well on rosuvastatin    Hypertension-well managed with current medications    She declines all offered vaccines today.        Telephone on 11/21/2023   Component Date Value Ref Range Status    Creatinine, Urine 11/30/2023 56  20 - 275 mg/dL Final    Microalb, Ur 11/30/2023 0.3  See Note: mg/dL Final    Microalb/Creat Ratio 11/30/2023 5  <30 mcg/mg creat Final    Cholesterol 11/30/2023 158  <200 mg/dL Final    HDL 11/30/2023 70  > OR = 50 mg/dL Final    Triglycerides 11/30/2023 113  <150 mg/dL Final    LDL Cholesterol 11/30/2023 68  mg/dL (calc) Final    HDL/Cholesterol Ratio 11/30/2023 2.3  <5.0 (calc) Final    Non HDL Chol. (LDL+VLDL) 11/30/2023 88  <130 mg/dL (calc) Final    Glucose 11/30/2023 114 (H)  65 - 99 mg/dL Final    BUN 11/30/2023 13  7 - 25 mg/dL Final    Creatinine 11/30/2023 0.66  0.60 - 1.00 mg/dL Final    eGFR 11/30/2023 92  > OR = 60 mL/min/1.73m2 Final    BUN/Creatinine Ratio 11/30/2023 SEE NOTE:  6 - 22 (calc) Final    Sodium 11/30/2023 144  135 - 146 mmol/L Final    Potassium 11/30/2023 4.1  3.5 - 5.3 mmol/L Final    Chloride 11/30/2023  107  98 - 110 mmol/L Final    CO2 11/30/2023 30  20 - 32 mmol/L Final    Calcium 11/30/2023 9.0  8.6 - 10.4 mg/dL Final    Total Protein 11/30/2023 6.4  6.1 - 8.1 g/dL Final    Albumin 11/30/2023 4.3  3.6 - 5.1 g/dL Final    Globulin, Total 11/30/2023 2.1  1.9 - 3.7 g/dL (calc) Final    Albumin/Globulin Ratio 11/30/2023 2.0  1.0 - 2.5 (calc) Final    Total Bilirubin 11/30/2023 0.5  0.2 - 1.2 mg/dL Final    Alkaline Phosphatase 11/30/2023 75  37 - 153 U/L Final    AST 11/30/2023 20  10 - 35 U/L Final    ALT 11/30/2023 23  6 - 29 U/L Final    TSH w/reflex to FT4 11/30/2023 1.87  0.40 - 4.50 mIU/L Final    Color, UA 11/30/2023 YELLOW  YELLOW Final    Appearance, UA 11/30/2023 CLOUDY (A)  CLEAR Final    Specific [place], UA 11/30/2023 1.013  1.001 - 1.035 Final    pH, UA 11/30/2023 7.0  5.0 - 8.0 Final    Glucose, UA 11/30/2023 NEGATIVE  NEGATIVE Final    Bilirubin, UA 11/30/2023 NEGATIVE  NEGATIVE Final    Ketones, UA 11/30/2023 NEGATIVE  NEGATIVE Final    Occult Blood UA 11/30/2023 NEGATIVE  NEGATIVE Final    Protein, UA 11/30/2023 NEGATIVE  NEGATIVE Final    Nitrite, UA 11/30/2023 NEGATIVE  NEGATIVE Final    Leukocytes, UA 11/30/2023 NEGATIVE  NEGATIVE Final    WBC Casts, UA 11/30/2023 NONE SEEN  < OR = 5 /HPF Final    RBC Casts, UA 11/30/2023 NONE SEEN  < OR = 2 /HPF Final    Squam Epithel, UA 11/30/2023 NONE SEEN  < OR = 5 /HPF Final    Bacteria, UA 11/30/2023 NONE SEEN  NONE SEEN /HPF Final    Hyaline Casts, UA 11/30/2023 NONE SEEN  NONE SEEN /LPF Final    Service Cmt: 11/30/2023    Final    Reflexive Urine Culture 11/30/2023    Final   Patient Outreach on 11/02/2023   Component Date Value Ref Range Status    CRC Recommendation External 11/01/2023 No repeat colonoscopy   Final   Office Visit on 07/31/2023   Component Date Value Ref Range Status    POC Blood, Urine 07/31/2023 Negative  Negative Final    POC Bilirubin, Urine 07/31/2023 Negative  Negative Final    POC Urobilinogen, Urine 07/31/2023 0.2  0.1 - 1.1  Final    POC Ketones, Urine 07/31/2023 Negative  Negative Final    POC Protein, Urine 07/31/2023 Negative  Negative Final    POC Nitrates, Urine 07/31/2023 Negative  Negative Final    POC Glucose, Urine 07/31/2023 Negative  Negative Final    pH, UA 07/31/2023 6.0   Final    POC Specific Gravity, Urine 07/31/2023 1.010  1.003 - 1.029 Final    POC Leukocytes, Urine 07/31/2023 Negative  Negative Final    Urine Culture, Routine 07/31/2023    Final       Past Medical History:   Diagnosis Date    Diverticulosis     History of nuclear stress test 2015    Normal    Hyperlipidemia     Hypertension     Mitral valve prolapse     Personal history of colonic polyps 11/01/2023     Social History     Socioeconomic History    Marital status:    Tobacco Use    Smoking status: Every Day     Types: Vaping with nicotine    Smokeless tobacco: Never   Substance and Sexual Activity    Drug use: Never     Past Surgical History:   Procedure Laterality Date    CHOLECYSTECTOMY      COLONOSCOPY  2014    Dr. Avilez RTC 10 years    COLONOSCOPY  11/01/2023    Repeat colonoscopy not recommended for surveillance per Dr Avilez    HYSTERECTOMY      LAPAROSCOPY       Family History   Problem Relation Age of Onset    Diabetes Mother     Heart disease Mother     Cancer Father     Breast cancer Maternal Grandmother        The 10-year CVD risk score (Tyler et al., 2008) is: 12.8%    Values used to calculate the score:      Age: 73 years      Sex: Female      Diabetic: No      Tobacco smoker: Yes      Systolic Blood Pressure: 120 mmHg      Is BP treated: Yes      HDL Cholesterol: 70 mg/dL      Total Cholesterol: 158 mg/dL    Tests to Keep You Healthy    Mammogram: Met on 6/19/2023  Last Blood Pressure <= 139/89 (12/7/2023): Yes  Tobacco Cessation: NO      Review of patient's allergies indicates:   Allergen Reactions    Meperidine     Penicillins        Current Outpatient Medications:     aspirin (ECOTRIN) 81 MG EC tablet, Take 81 mg by  "mouth once daily., Disp: , Rfl:     amLODIPine (NORVASC) 5 MG tablet, Take 1 tablet (5 mg total) by mouth once daily., Disp: 90 tablet, Rfl: 3    levothyroxine (SYNTHROID) 25 MCG tablet, Take 1 tablet (25 mcg total) by mouth before breakfast., Disp: 90 tablet, Rfl: 3    omeprazole (PRILOSEC) 40 MG capsule, Take 1 capsule (40 mg total) by mouth once daily., Disp: 90 capsule, Rfl: 3    raloxifene (EVISTA) 60 mg tablet, Take 1 tablet (60 mg total) by mouth once daily., Disp: 90 tablet, Rfl: 3    rosuvastatin (CRESTOR) 5 MG tablet, Take 1 tablet (5 mg total) by mouth once daily., Disp: 90 tablet, Rfl: 3    Review of Systems   Constitutional:  Negative for appetite change, chills, fatigue, fever and unexpected weight change.   HENT:  Negative for ear discharge and ear pain.    Eyes:  Negative for pain, discharge and visual disturbance.   Respiratory:  Negative for apnea, cough, shortness of breath and wheezing.    Cardiovascular:  Negative for chest pain, palpitations and leg swelling.   Gastrointestinal:  Negative for abdominal pain, blood in stool, constipation, diarrhea, nausea, vomiting and reflux.   Endocrine: Negative for cold intolerance, heat intolerance and polydipsia.   Genitourinary:  Negative for bladder incontinence, dysuria, hematuria, nocturia and urgency.   Musculoskeletal:  Negative for gait problem, joint swelling and myalgias.   Neurological:  Negative for dizziness, seizures and numbness.   Psychiatric/Behavioral:  Negative for behavioral problems and hallucinations. The patient is not nervous/anxious.            Objective:      Vitals:    12/07/23 1114   BP: 120/70   Pulse: 93   Weight: 55.8 kg (123 lb)   Height: 4' 11" (1.499 m)     Physical Exam  Vitals and nursing note reviewed.   Constitutional:       General: She is not in acute distress.     Appearance: Normal appearance. She is well-developed. She is not toxic-appearing.   HENT:      Head: Normocephalic and atraumatic.      Right Ear: Tympanic " membrane and external ear normal.      Left Ear: Tympanic membrane and external ear normal.      Nose: Nose normal.      Mouth/Throat:      Pharynx: Oropharynx is clear.   Eyes:      Pupils: Pupils are equal, round, and reactive to light.   Neck:      Thyroid: No thyromegaly.      Vascular: No carotid bruit.   Cardiovascular:      Rate and Rhythm: Normal rate and regular rhythm.      Heart sounds: Normal heart sounds. No murmur heard.  Pulmonary:      Effort: Pulmonary effort is normal.      Breath sounds: Normal breath sounds. No wheezing or rales.   Abdominal:      General: Bowel sounds are normal. There is no distension.      Palpations: Abdomen is soft.      Tenderness: There is no abdominal tenderness.   Musculoskeletal:         General: No tenderness or deformity. Normal range of motion.      Cervical back: Normal range of motion and neck supple.      Lumbar back: Normal. No spasms.      Comments: Bends 90 degrees at  waist, shoulders good range of motion, knees are slightly crepitant.  No pitting edema to lower extremities   Lymphadenopathy:      Cervical: No cervical adenopathy.   Skin:     General: Skin is warm and dry.      Findings: No rash.   Neurological:      Mental Status: She is alert and oriented to person, place, and time. Mental status is at baseline.      Cranial Nerves: No cranial nerve deficit.      Coordination: Coordination normal.   Psychiatric:         Mood and Affect: Mood normal.         Behavior: Behavior normal.         Thought Content: Thought content normal.         Judgment: Judgment normal.           Assessment:       1. Essential hypertension    2. Acquired hypothyroidism    3. Gastroesophageal reflux disease without esophagitis    4. Adult idiopathic generalized osteoporosis    5. Pure hypercholesterolemia    6. Other emphysema    7. Primary hypertension    8. MVP (mitral valve prolapse)    9. Palpitations    10. Diverticulosis of large intestine without perforation or abscess  without bleeding    11. Slow transit constipation    12. Cigarette nicotine dependence without complication         Plan:       Essential hypertension  -     amLODIPine (NORVASC) 5 MG tablet; Take 1 tablet (5 mg total) by mouth once daily.  Dispense: 90 tablet; Refill: 3  Blood pressure well controlled  Acquired hypothyroidism  -     levothyroxine (SYNTHROID) 25 MCG tablet; Take 1 tablet (25 mcg total) by mouth before breakfast.  Dispense: 90 tablet; Refill: 3    Gastroesophageal reflux disease without esophagitis  -     omeprazole (PRILOSEC) 40 MG capsule; Take 1 capsule (40 mg total) by mouth once daily.  Dispense: 90 capsule; Refill: 3    Adult idiopathic generalized osteoporosis  -     raloxifene (EVISTA) 60 mg tablet; Take 1 tablet (60 mg total) by mouth once daily.  Dispense: 90 tablet; Refill: 3  She declines DEXA scan this year but agrees to do it in 2024.  Pure hypercholesterolemia  -     rosuvastatin (CRESTOR) 5 MG tablet; Take 1 tablet (5 mg total) by mouth once daily.  Dispense: 90 tablet; Refill: 3  Cholesterol 158 HDL 78  LDL 68 excellent lipid panel  Other emphysema  No shortness a breath  Primary hypertension    MVP (mitral valve prolapse)    Palpitations    Diverticulosis of large intestine without perforation or abscess without bleeding  Status post colonoscopy this year, no RTC necessary  Slow transit constipation  Senokot working well  Cigarette nicotine dependence without complication  Encouraged her to reduce vaping and reduce nicotine    Follow up in about 6 months (around 6/7/2024), or Osteopenia, hyperlipidemia, hypertension.        12/7/2023 Deacon Davies

## 2024-05-03 ENCOUNTER — TELEPHONE (OUTPATIENT)
Dept: FAMILY MEDICINE | Facility: CLINIC | Age: 74
End: 2024-05-03

## 2024-05-03 NOTE — TELEPHONE ENCOUNTER
----- Message from Catalina Cisneros LPN sent at 5/3/2024 10:59 AM CDT -----    ----- Message -----  From: Karena Maya  Sent: 5/3/2024  10:54 AM CDT  To: Deacon Davies Staff    Pt calling to reschedule her appointment.   [phone]

## 2024-07-02 ENCOUNTER — OFFICE VISIT (OUTPATIENT)
Dept: FAMILY MEDICINE | Facility: CLINIC | Age: 74
End: 2024-07-02
Attending: FAMILY MEDICINE
Payer: MEDICARE

## 2024-07-02 VITALS
HEART RATE: 89 BPM | BODY MASS INDEX: 25.6 KG/M2 | DIASTOLIC BLOOD PRESSURE: 68 MMHG | SYSTOLIC BLOOD PRESSURE: 110 MMHG | WEIGHT: 127 LBS | HEIGHT: 59 IN | OXYGEN SATURATION: 99 %

## 2024-07-02 DIAGNOSIS — E78.00 PURE HYPERCHOLESTEROLEMIA: ICD-10-CM

## 2024-07-02 DIAGNOSIS — Z78.0 MENOPAUSE: ICD-10-CM

## 2024-07-02 DIAGNOSIS — E03.9 ACQUIRED HYPOTHYROIDISM: ICD-10-CM

## 2024-07-02 DIAGNOSIS — J43.8 OTHER EMPHYSEMA: ICD-10-CM

## 2024-07-02 DIAGNOSIS — I34.1 MVP (MITRAL VALVE PROLAPSE): ICD-10-CM

## 2024-07-02 DIAGNOSIS — K21.9 GASTROESOPHAGEAL REFLUX DISEASE WITHOUT ESOPHAGITIS: ICD-10-CM

## 2024-07-02 DIAGNOSIS — Z12.31 OTHER SCREENING MAMMOGRAM: ICD-10-CM

## 2024-07-02 DIAGNOSIS — I10 PRIMARY HYPERTENSION: ICD-10-CM

## 2024-07-02 DIAGNOSIS — K59.01 SLOW TRANSIT CONSTIPATION: ICD-10-CM

## 2024-07-02 DIAGNOSIS — M81.8 ADULT IDIOPATHIC GENERALIZED OSTEOPOROSIS: ICD-10-CM

## 2024-07-02 DIAGNOSIS — L72.3 SEBACEOUS CYST: ICD-10-CM

## 2024-07-02 DIAGNOSIS — Z13.820 ENCOUNTER FOR IMAGING TO ASSESS OSTEOPOROSIS: ICD-10-CM

## 2024-07-02 DIAGNOSIS — F17.210 CIGARETTE NICOTINE DEPENDENCE WITHOUT COMPLICATION: Primary | ICD-10-CM

## 2024-07-02 PROCEDURE — 99214 OFFICE O/P EST MOD 30 MIN: CPT | Mod: S$GLB,,, | Performed by: FAMILY MEDICINE

## 2024-07-02 RX ORDER — AZELASTINE 1 MG/ML
SPRAY, METERED NASAL
COMMUNITY
Start: 2023-10-25

## 2024-07-06 NOTE — PROGRESS NOTES
SUBJECTIVE:    Patient ID: Sophia Alberto is a 73 y.o. female.    Chief Complaint: Follow-up (Bottles brought//Pt here for follow up//discuss right pinky toe//discuss mammo and dexa//discuss low dose CT scan//JL)    73-year-old female here for six-month checkup.  For her allergic rhinitis she has been using Astelin spray    Vapes daily, no significant alcohol intake.    Right 5th little toe has a bunionette is slightly tender    Constipation-controlled with Senokot    November 20, 2023-colonoscopy with Dr. Avilez-no RTC necessary    Left lumbar area has a small sebaceous cyst present.    Hypertension-well-controlled    Osteoporosis-on raloxifene daily    Follow-up  Pertinent negatives include no abdominal pain, chest pain, chills, coughing, fatigue, fever, joint swelling, myalgias, nausea, numbness or vomiting.       No visits with results within 6 Month(s) from this visit.   Latest known visit with results is:   Office Visit on 12/07/2023   Component Date Value Ref Range Status    WBC 06/13/2024 8.9  3.8 - 10.8 Thousand/uL Final    RBC 06/13/2024 4.82  3.80 - 5.10 Million/uL Final    Hemoglobin 06/13/2024 15.2  11.7 - 15.5 g/dL Final    Hematocrit 06/13/2024 46.0 (H)  35.0 - 45.0 % Final    MCV 06/13/2024 95.4  80.0 - 100.0 fL Final    MCH 06/13/2024 31.5  27.0 - 33.0 pg Final    MCHC 06/13/2024 33.0  32.0 - 36.0 g/dL Final    RDW 06/13/2024 12.6  11.0 - 15.0 % Final    Platelets 06/13/2024 254  140 - 400 Thousand/uL Final    MPV 06/13/2024 10.7  7.5 - 12.5 fL Final    Neutrophils, Abs 06/13/2024 5,625  1,500 - 7,800 cells/uL Final    Lymph # 06/13/2024 2,394  850 - 3,900 cells/uL Final    Mono # 06/13/2024 650  200 - 950 cells/uL Final    Eos # 06/13/2024 160  15 - 500 cells/uL Final    Baso # 06/13/2024 71  0 - 200 cells/uL Final    Neutrophils Relative 06/13/2024 63.2  % Final    Lymph % 06/13/2024 26.9  % Final    Mono % 06/13/2024 7.3  % Final    Eosinophil % 06/13/2024 1.8  % Final    Basophil %  06/13/2024 0.8  % Final    Glucose 06/13/2024 118 (H)  65 - 99 mg/dL Final    BUN 06/13/2024 11  7 - 25 mg/dL Final    Creatinine 06/13/2024 0.67  0.60 - 1.00 mg/dL Final    eGFR 06/13/2024 92  > OR = 60 mL/min/1.73m2 Final    BUN/Creatinine Ratio 06/13/2024 SEE NOTE:  6 - 22 (calc) Final    Sodium 06/13/2024 143  135 - 146 mmol/L Final    Potassium 06/13/2024 3.8  3.5 - 5.3 mmol/L Final    Chloride 06/13/2024 105  98 - 110 mmol/L Final    CO2 06/13/2024 31  20 - 32 mmol/L Final    Calcium 06/13/2024 8.9  8.6 - 10.4 mg/dL Final    Total Protein 06/13/2024 6.5  6.1 - 8.1 g/dL Final    Albumin 06/13/2024 4.2  3.6 - 5.1 g/dL Final    Globulin, Total 06/13/2024 2.3  1.9 - 3.7 g/dL (calc) Final    Albumin/Globulin Ratio 06/13/2024 1.8  1.0 - 2.5 (calc) Final    Total Bilirubin 06/13/2024 0.6  0.2 - 1.2 mg/dL Final    Alkaline Phosphatase 06/13/2024 80  37 - 153 U/L Final    AST 06/13/2024 22  10 - 35 U/L Final    ALT 06/13/2024 20  6 - 29 U/L Final    Cholesterol 06/13/2024 153  <200 mg/dL Final    HDL 06/13/2024 71  > OR = 50 mg/dL Final    Triglycerides 06/13/2024 113  <150 mg/dL Final    LDL Cholesterol 06/13/2024 62  mg/dL (calc) Final    HDL/Cholesterol Ratio 06/13/2024 2.2  <5.0 (calc) Final    Non HDL Chol. (LDL+VLDL) 06/13/2024 82  <130 mg/dL (calc) Final    Color, UA 06/13/2024 YELLOW  YELLOW Final    Appearance, UA 06/13/2024 CLEAR  CLEAR Final    Specific Gravity, UA 06/13/2024 1.012  1.001 - 1.035 Final    pH, UA 06/13/2024 7.0  5.0 - 8.0 Final    Glucose, UA 06/13/2024 NEGATIVE  NEGATIVE Final    Bilirubin, UA 06/13/2024 NEGATIVE  NEGATIVE Final    Ketones, UA 06/13/2024 NEGATIVE  NEGATIVE Final    Occult Blood UA 06/13/2024 NEGATIVE  NEGATIVE Final    Protein, UA 06/13/2024 NEGATIVE  NEGATIVE Final    Nitrite, UA 06/13/2024 NEGATIVE  NEGATIVE Final    Leukocytes, UA 06/13/2024 NEGATIVE  NEGATIVE Final    WBC Casts, UA 06/13/2024 NONE SEEN  < OR = 5 /HPF Final    RBC Casts, UA 06/13/2024 NONE SEEN  < OR = 2  /HPF Final    Squam Epithel, UA 06/13/2024 NONE SEEN  < OR = 5 /HPF Final    Bacteria, UA 06/13/2024 NONE SEEN  NONE SEEN /HPF Final    Hyaline Casts, UA 06/13/2024 NONE SEEN  NONE SEEN /LPF Final    Service Cmt: 06/13/2024 SEE COMMENT   Final    Reflexive Urine Culture 06/13/2024 SEE COMMENT   Final    TSH w/reflex to FT4 06/13/2024 2.61  0.40 - 4.50 mIU/L Final       Past Medical History:   Diagnosis Date    Diverticulosis     History of nuclear stress test 2015    Normal    Hyperlipidemia     Hypertension     Mitral valve prolapse     Personal history of colonic polyps 11/01/2023     Social History     Socioeconomic History    Marital status:    Tobacco Use    Smoking status: Every Day     Types: Vaping with nicotine    Smokeless tobacco: Never   Substance and Sexual Activity    Drug use: Never     Past Surgical History:   Procedure Laterality Date    CHOLECYSTECTOMY      COLONOSCOPY  2014    Dr. Avilez RTC 10 years    COLONOSCOPY  11/01/2023    Repeat colonoscopy not recommended for surveillance per Dr Avilez    HYSTERECTOMY      LAPAROSCOPY       Family History   Problem Relation Name Age of Onset    Diabetes Mother      Heart disease Mother      Cancer Father      Breast cancer Maternal Grandmother         The 10-year CVD risk score (Tyler et al., 2008) is: 9.7%    Values used to calculate the score:      Age: 73 years      Sex: Female      Diabetic: No      Tobacco smoker: Yes      Systolic Blood Pressure: 110 mmHg      Is BP treated: Yes      HDL Cholesterol: 71 mg/dL      Total Cholesterol: 153 mg/dL    Tests to Keep You Healthy    Mammogram: ORDERED BUT NOT SCHEDULED  Last Blood Pressure <= 139/89 (7/2/2024): Yes  Tobacco Cessation: NO      Review of patient's allergies indicates:   Allergen Reactions    Meperidine     Penicillins        Current Outpatient Medications:     amLODIPine (NORVASC) 5 MG tablet, Take 1 tablet (5 mg total) by mouth once daily., Disp: 90 tablet, Rfl: 3    aspirin  "(ECOTRIN) 81 MG EC tablet, Take 81 mg by mouth once daily., Disp: , Rfl:     azelastine (ASTELIN) 137 mcg (0.1 %) nasal spray, , Disp: , Rfl:     levothyroxine (SYNTHROID) 25 MCG tablet, Take 1 tablet (25 mcg total) by mouth before breakfast., Disp: 90 tablet, Rfl: 3    omeprazole (PRILOSEC) 40 MG capsule, Take 1 capsule (40 mg total) by mouth once daily., Disp: 90 capsule, Rfl: 3    raloxifene (EVISTA) 60 mg tablet, Take 1 tablet (60 mg total) by mouth once daily., Disp: 90 tablet, Rfl: 3    rosuvastatin (CRESTOR) 5 MG tablet, Take 1 tablet (5 mg total) by mouth once daily., Disp: 90 tablet, Rfl: 3    Review of Systems   Constitutional:  Negative for appetite change, chills, fatigue, fever and unexpected weight change.   HENT:  Negative for ear discharge and ear pain.    Eyes:  Negative for pain, discharge and visual disturbance.   Respiratory:  Negative for apnea, cough, shortness of breath and wheezing.    Cardiovascular:  Negative for chest pain, palpitations and leg swelling.   Gastrointestinal:  Negative for abdominal pain, blood in stool, constipation, diarrhea, nausea, vomiting and reflux.   Endocrine: Negative for cold intolerance, heat intolerance and polydipsia.   Genitourinary:  Negative for bladder incontinence, dysuria, hematuria, nocturia and urgency.   Musculoskeletal:  Negative for gait problem, joint swelling and myalgias.   Neurological:  Negative for dizziness, seizures and numbness.   Psychiatric/Behavioral:  Negative for behavioral problems and hallucinations. The patient is not nervous/anxious.            Objective:      Vitals:    07/02/24 0945   BP: 110/68   Pulse: 89   SpO2: 99%   Weight: 57.6 kg (127 lb)   Height: 4' 11" (1.499 m)     Physical Exam  Vitals and nursing note reviewed.   Constitutional:       General: She is not in acute distress.     Appearance: Normal appearance. She is well-developed. She is not toxic-appearing.   HENT:      Head: Normocephalic and atraumatic.      Right " Ear: Tympanic membrane and external ear normal.      Left Ear: Tympanic membrane and external ear normal.      Nose: Nose normal.      Mouth/Throat:      Pharynx: Oropharynx is clear.   Eyes:      Pupils: Pupils are equal, round, and reactive to light.   Neck:      Thyroid: No thyromegaly.      Vascular: No carotid bruit.   Cardiovascular:      Rate and Rhythm: Normal rate and regular rhythm.      Heart sounds: Normal heart sounds. No murmur heard.  Pulmonary:      Effort: Pulmonary effort is normal.      Breath sounds: Normal breath sounds. No wheezing or rales.   Abdominal:      General: Bowel sounds are normal. There is no distension.      Palpations: Abdomen is soft.      Tenderness: There is no abdominal tenderness.   Musculoskeletal:         General: No tenderness or deformity. Normal range of motion.      Cervical back: Normal range of motion and neck supple.      Lumbar back: Normal. No spasms.      Comments: Bends 90 degrees at  waist   Lymphadenopathy:      Cervical: No cervical adenopathy.   Skin:     General: Skin is warm and dry.      Findings: No rash.      Comments: Small sebaceous cyst in the right lumbar area   Neurological:      Mental Status: She is alert and oriented to person, place, and time.      Cranial Nerves: No cranial nerve deficit.      Coordination: Coordination normal.   Psychiatric:         Mood and Affect: Mood normal.         Behavior: Behavior normal.         Thought Content: Thought content normal.         Judgment: Judgment normal.           Assessment:       1. Cigarette nicotine dependence without complication    2. Other screening mammogram    3. Encounter for imaging to assess osteoporosis    4. Menopause    5. Sebaceous cyst    6. Other emphysema    7. MVP (mitral valve prolapse)    8. Primary hypertension    9. Pure hypercholesterolemia    10. Adult idiopathic generalized osteoporosis    11. Acquired hypothyroidism    12. Slow transit constipation    13. Gastroesophageal  reflux disease without esophagitis         Plan:       Cigarette nicotine dependence without complication  -     CT Chest Lung Screening Low Dose; Future; Expected date: 07/02/2024  Low-dose screening CT of the chest ordered  Other screening mammogram  -     Mammo Digital Screening Bilat; Future; Expected date: 07/02/2024  Mammogram order  Encounter for imaging to assess osteoporosis  -     DXA Bone Density Axial Skeleton 1 or more sites; Future; Expected date: 07/02/2024  Due for DEXA scan, continue raloxifene  Menopause  -     DXA Bone Density Axial Skeleton 1 or more sites; Future; Expected date: 07/02/2024    Sebaceous cyst  Observation only at this time, can see a dermatologist for excision if desired  Other emphysema  Encourage decreased vaping  MVP (mitral valve prolapse)    Primary hypertension  Blood pressure well controlled with current regimen  Pure hypercholesterolemia  Cholesterol 153 HDL 71  LDL 62  Adult idiopathic generalized osteoporosis    Acquired hypothyroidism    Slow transit constipation    Gastroesophageal reflux disease without esophagitis      Follow up in about 6 months (around 1/2/2025), or copd htn.        7/6/2024 Deacon Davies

## 2024-08-07 LAB
BCS RECOMMENDATION EXT: NORMAL
BMD RECOMMENDATION EXT: NORMAL

## 2024-08-09 ENCOUNTER — TELEPHONE (OUTPATIENT)
Dept: FAMILY MEDICINE | Facility: CLINIC | Age: 74
End: 2024-08-09
Payer: MEDICARE

## 2024-08-14 ENCOUNTER — PATIENT OUTREACH (OUTPATIENT)
Dept: ADMINISTRATIVE | Facility: HOSPITAL | Age: 74
End: 2024-08-14
Payer: MEDICARE

## 2024-08-15 ENCOUNTER — PATIENT OUTREACH (OUTPATIENT)
Dept: ADMINISTRATIVE | Facility: HOSPITAL | Age: 74
End: 2024-08-15
Payer: MEDICARE

## 2024-08-15 NOTE — PROGRESS NOTES
Population Health Chart Review & Patient Outreach Details      Additional [facility] Health Notes:               Updates Requested / Reviewed:      Updated Care Coordination Note and          Health Maintenance Topics Overdue:      VBHM Score: 2     Osteoporosis Screening  Mammogram    Pneumonia Vaccine  Tetanus Vaccine  Shingles/Zoster Vaccine  RSV Vaccine                  Health Maintenance Topic(s) Outreach Outcomes & Actions Taken:    Breast Cancer Screening - Outreach Outcomes & Actions Taken  : External Records Uploaded & Care Team Updated if Applicable

## 2024-08-16 ENCOUNTER — TELEPHONE (OUTPATIENT)
Dept: FAMILY MEDICINE | Facility: CLINIC | Age: 74
End: 2024-08-16
Payer: MEDICARE

## 2024-08-19 ENCOUNTER — TELEPHONE (OUTPATIENT)
Dept: FAMILY MEDICINE | Facility: CLINIC | Age: 74
End: 2024-08-19
Payer: MEDICARE

## 2024-08-19 NOTE — TELEPHONE ENCOUNTER
Spoke to pt verbatim Dr Davies. Verbalized understanding.  Pt asked about the bone density results. Pt states she did the mammo and Dexa on the same day. Pt went to [facility] result is in the chart.

## 2024-08-19 NOTE — TELEPHONE ENCOUNTER
----- Message from Deacon Davies MD sent at 8/15/2024  1:11 PM CDT -----  Call pt/. Mammogram was  normal. No malignancy seen . Repeat in 1 year.   Ivermectin Counseling:  Patient instructed to take medication on an empty stomach with a full glass of water.  Patient informed of potential adverse effects including but not limited to nausea, diarrhea, dizziness, itching, and swelling of the extremities or lymph nodes.  The patient verbalized understanding of the proper use and possible adverse effects of ivermectin.  All of the patient's questions and concerns were addressed.

## 2024-08-20 NOTE — TELEPHONE ENCOUNTER
"Spoke to pt and let her know Per Dr. Davies " Dxa shows decrease in bone density in spine and both hips. Positive for osteoporosis would she like to do prolia injections" Pt verbalized understanding and stated she will wait on prolia she wants to talk to Dr. Davies about it personally visit at next visit and she will continue Evista   "

## 2024-12-17 ENCOUNTER — OFFICE VISIT (OUTPATIENT)
Dept: FAMILY MEDICINE | Facility: CLINIC | Age: 74
End: 2024-12-17
Payer: MEDICARE

## 2024-12-17 ENCOUNTER — TELEPHONE (OUTPATIENT)
Dept: FAMILY MEDICINE | Facility: CLINIC | Age: 74
End: 2024-12-17

## 2024-12-17 VITALS
OXYGEN SATURATION: 97 % | BODY MASS INDEX: 25.16 KG/M2 | SYSTOLIC BLOOD PRESSURE: 112 MMHG | TEMPERATURE: 99 F | HEIGHT: 59 IN | DIASTOLIC BLOOD PRESSURE: 72 MMHG | HEART RATE: 97 BPM | WEIGHT: 124.81 LBS

## 2024-12-17 DIAGNOSIS — J06.9 VIRAL URI: Primary | ICD-10-CM

## 2024-12-17 LAB
CTP QC/QA: YES
MOLECULAR STREP A: NEGATIVE
POC MOLECULAR INFLUENZA A AGN: NEGATIVE
POC MOLECULAR INFLUENZA B AGN: NEGATIVE
SARS-COV-2 RDRP RESP QL NAA+PROBE: NEGATIVE

## 2024-12-17 PROCEDURE — 87635 SARS-COV-2 COVID-19 AMP PRB: CPT | Mod: QW,S$GLB,, | Performed by: NURSE PRACTITIONER

## 2024-12-17 PROCEDURE — 99213 OFFICE O/P EST LOW 20 MIN: CPT | Mod: 25,S$GLB,, | Performed by: NURSE PRACTITIONER

## 2024-12-17 PROCEDURE — 87651 STREP A DNA AMP PROBE: CPT | Mod: QW,,, | Performed by: NURSE PRACTITIONER

## 2024-12-17 PROCEDURE — 96372 THER/PROPH/DIAG INJ SC/IM: CPT | Mod: S$GLB,,, | Performed by: NURSE PRACTITIONER

## 2024-12-17 PROCEDURE — 87502 INFLUENZA DNA AMP PROBE: CPT | Mod: QW,,, | Performed by: NURSE PRACTITIONER

## 2024-12-17 RX ORDER — DEXAMETHASONE SODIUM PHOSPHATE 4 MG/ML
4 INJECTION, SOLUTION INTRA-ARTICULAR; INTRALESIONAL; INTRAMUSCULAR; INTRAVENOUS; SOFT TISSUE ONCE
Status: COMPLETED | OUTPATIENT
Start: 2024-12-17 | End: 2024-12-17

## 2024-12-17 RX ADMIN — DEXAMETHASONE SODIUM PHOSPHATE 4 MG: 4 INJECTION, SOLUTION INTRA-ARTICULAR; INTRALESIONAL; INTRAMUSCULAR; INTRAVENOUS; SOFT TISSUE at 12:12

## 2024-12-17 NOTE — TELEPHONE ENCOUNTER
----- Message from Makeda sent at 12/17/2024  9:40 AM CST -----  The patient wants to be seen today. Sore throat. Pt's # [phone]

## 2024-12-17 NOTE — TELEPHONE ENCOUNTER
Started with sore throat a few days ago, improved some but now achy and feeling bad. Wants to rule out flu/covid because supposed to be around children tomorrow  Booked with Hellen @ [email or web address]

## 2024-12-26 ENCOUNTER — TELEPHONE (OUTPATIENT)
Dept: FAMILY MEDICINE | Facility: CLINIC | Age: 74
End: 2024-12-26
Payer: MEDICARE

## 2024-12-26 DIAGNOSIS — I10 PRIMARY HYPERTENSION: ICD-10-CM

## 2024-12-26 DIAGNOSIS — E78.00 PURE HYPERCHOLESTEROLEMIA: ICD-10-CM

## 2024-12-26 DIAGNOSIS — Z79.899 ENCOUNTER FOR LONG-TERM (CURRENT) USE OF MEDICATIONS: Primary | ICD-10-CM

## 2025-01-15 ENCOUNTER — OFFICE VISIT (OUTPATIENT)
Dept: FAMILY MEDICINE | Facility: CLINIC | Age: 75
End: 2025-01-15
Payer: MEDICARE

## 2025-01-15 VITALS
OXYGEN SATURATION: 99 % | SYSTOLIC BLOOD PRESSURE: 120 MMHG | DIASTOLIC BLOOD PRESSURE: 70 MMHG | BODY MASS INDEX: 25.4 KG/M2 | HEIGHT: 59 IN | WEIGHT: 126 LBS | HEART RATE: 99 BPM

## 2025-01-15 DIAGNOSIS — K21.9 GASTROESOPHAGEAL REFLUX DISEASE WITHOUT ESOPHAGITIS: ICD-10-CM

## 2025-01-15 DIAGNOSIS — I10 PRIMARY HYPERTENSION: ICD-10-CM

## 2025-01-15 DIAGNOSIS — K59.01 SLOW TRANSIT CONSTIPATION: ICD-10-CM

## 2025-01-15 DIAGNOSIS — E78.00 PURE HYPERCHOLESTEROLEMIA: ICD-10-CM

## 2025-01-15 DIAGNOSIS — K57.30 DIVERTICULOSIS OF LARGE INTESTINE WITHOUT PERFORATION OR ABSCESS WITHOUT BLEEDING: ICD-10-CM

## 2025-01-15 DIAGNOSIS — I10 ESSENTIAL HYPERTENSION: ICD-10-CM

## 2025-01-15 DIAGNOSIS — E03.9 ACQUIRED HYPOTHYROIDISM: ICD-10-CM

## 2025-01-15 DIAGNOSIS — J43.8 OTHER EMPHYSEMA: ICD-10-CM

## 2025-01-15 DIAGNOSIS — M81.0 AGE-RELATED OSTEOPOROSIS WITHOUT CURRENT PATHOLOGICAL FRACTURE: Primary | ICD-10-CM

## 2025-01-15 DIAGNOSIS — M81.8 ADULT IDIOPATHIC GENERALIZED OSTEOPOROSIS: ICD-10-CM

## 2025-01-15 PROCEDURE — 99214 OFFICE O/P EST MOD 30 MIN: CPT | Mod: S$GLB,,, | Performed by: FAMILY MEDICINE

## 2025-01-15 RX ORDER — ALENDRONATE SODIUM 70 MG/1
70 TABLET ORAL
Qty: 12 TABLET | Refills: 3 | Status: SHIPPED | OUTPATIENT
Start: 2025-01-15 | End: 2026-01-15

## 2025-01-15 RX ORDER — RALOXIFENE HYDROCHLORIDE 60 MG/1
60 TABLET, FILM COATED ORAL DAILY
Qty: 90 TABLET | Refills: 3 | Status: SHIPPED | OUTPATIENT
Start: 2025-01-15

## 2025-01-15 RX ORDER — ROSUVASTATIN CALCIUM 5 MG/1
5 TABLET, COATED ORAL DAILY
Qty: 90 TABLET | Refills: 3 | Status: SHIPPED | OUTPATIENT
Start: 2025-01-15

## 2025-01-15 RX ORDER — OMEPRAZOLE 40 MG/1
40 CAPSULE, DELAYED RELEASE ORAL DAILY
Qty: 90 CAPSULE | Refills: 3 | Status: SHIPPED | OUTPATIENT
Start: 2025-01-15

## 2025-01-15 RX ORDER — LEVOTHYROXINE SODIUM 25 UG/1
25 TABLET ORAL
Qty: 90 TABLET | Refills: 3 | Status: SHIPPED | OUTPATIENT
Start: 2025-01-15 | End: 2026-01-15

## 2025-01-15 RX ORDER — AMLODIPINE BESYLATE 5 MG/1
5 TABLET ORAL DAILY
Qty: 90 TABLET | Refills: 3 | Status: SHIPPED | OUTPATIENT
Start: 2025-01-15

## 2025-01-15 NOTE — PROGRESS NOTES
SUBJECTIVE:    Patient ID: Sophia Alberto is a 74 y.o. female.    Chief Complaint: Joint Pain (Hands//Feet pain, brought bottles, need refills, pt has some questions, declined pna shot, abc )    Joint Pain  Associated symptoms include abdominal pain and arthralgias. Pertinent negatives include no chest pain, chills, coughing, fatigue, fever, joint swelling, myalgias, nausea, numbness or vomiting.       History of Present Illness    CHIEF COMPLAINT:  Sophia presents today for follow-up on various health concerns.    MUSCULOSKELETAL:  She reports joint pain and stiffness in multiple areas. She experiences bilateral great toe pain and cracking, left worse than right. She also reports intermittent but severe hand pain and stiffness, particularly affecting one finger, which worsens in cold weather. She uses a hand massage and heat therapy device periodically for relief with uncertain effectiveness. She denies regular use of OTC pain medications for joint symptoms.    GASTROINTESTINAL:  She has chronic constipation managed with nightly Senecrois. She experiences persistent bloating and gas lasting 2-3 days, particularly noticeable when sitting, regardless of food intake. Gas-X provides minimal relief. She denies nausea and vomiting. She has known diverticulosis throughout the colon. She continues Omeprazole with good control of reflux symptoms.    BONE HEALTH:  She has osteoporosis in the spine and osteopenia in the hips with worsening bone density despite daily Avista for several years.    SOCIAL HISTORY:  She continues to vape and expresses desire to quit. She reports minimal alcohol consumption.      ROS:  Constitutional: -appetite change, -chills, -fatigue, -fever, -unexpected weight change  HENT: -ear pain, -trouble swallowing  Eyes: -pain, -discharge, -visual disturbance  Respiratory: -apnea, -cough, -shortness of breath, -wheezing  Cardiovascular: -chest pain, -leg swelling  Gastrointestinal: -abdominal pain,  -blood in stool, +constipation, -diarrhea, -nausea, -vomiting, -reflux  Endocrine: -cold intolerance, -heat intolerance, -polydipsia  Genitourinary: -bladder incontinence, -dysuria, -erectile dysfunction, -frequency, -hematuria, -testicular pain, -urgency, -nocturia  Musculoskeletal: -gait problem, -joint swelling, -myalgia, +joint pain, +joint stiffness  Neurological: -dizziness, -seizures, -numbness  Psychiatric/Behavioral: -agitation, -hallucinations, -nervous, -anxiety symptoms         Telephone on 12/26/2024   Component Date Value Ref Range Status    Glucose 01/07/2025 116 (H)  65 - 99 mg/dL Final    BUN 01/07/2025 12  7 - 25 mg/dL Final    Creatinine 01/07/2025 0.63  0.60 - 1.00 mg/dL Final    eGFR 01/07/2025 92  > OR = 60 mL/min/1.73m2 Final    BUN/Creatinine Ratio 01/07/2025 SEE NOTE:  6 - 22 (calc) Final    Sodium 01/07/2025 141  135 - 146 mmol/L Final    Potassium 01/07/2025 3.9  3.5 - 5.3 mmol/L Final    Chloride 01/07/2025 104  98 - 110 mmol/L Final    CO2 01/07/2025 29  20 - 32 mmol/L Final    Calcium 01/07/2025 8.7  8.6 - 10.4 mg/dL Final    Total Protein 01/07/2025 6.2  6.1 - 8.1 g/dL Final    Albumin 01/07/2025 4.1  3.6 - 5.1 g/dL Final    Globulin, Total 01/07/2025 2.1  1.9 - 3.7 g/dL (calc) Final    Albumin/Globulin Ratio 01/07/2025 2.0  1.0 - 2.5 (calc) Final    Total Bilirubin 01/07/2025 0.7  0.2 - 1.2 mg/dL Final    Alkaline Phosphatase 01/07/2025 78  37 - 153 U/L Final    AST 01/07/2025 20  10 - 35 U/L Final    ALT 01/07/2025 22  6 - 29 U/L Final    Cholesterol 01/07/2025 151  <200 mg/dL Final    HDL 01/07/2025 63  > OR = 50 mg/dL Final    Triglycerides 01/07/2025 119  <150 mg/dL Final    LDL Cholesterol 01/07/2025 67  mg/dL (calc) Final    HDL/Cholesterol Ratio 01/07/2025 2.4  <5.0 (calc) Final    Non HDL Chol. (LDL+VLDL) 01/07/2025 88  <130 mg/dL (calc) Final    Creatinine, Urine 01/07/2025 74  20 - 275 mg/dL Final    Microalb, Ur 01/07/2025 0.5  See Note: mg/dL Final    Microalb/Creat Ratio  01/07/2025 7  <30 mg/g creat Final    Color, UA 01/07/2025 YELLOW  YELLOW Final    Appearance, UA 01/07/2025 CLOUDY (A)  CLEAR Final    Specific Gravity, UA 01/07/2025 1.013  1.001 - 1.035 Final    pH, UA 01/07/2025 7.5  5.0 - 8.0 Final    Glucose, UA 01/07/2025 NEGATIVE  NEGATIVE Final    Bilirubin, UA 01/07/2025 NEGATIVE  NEGATIVE Final    Ketones, UA 01/07/2025 NEGATIVE  NEGATIVE Final    Occult Blood UA 01/07/2025 NEGATIVE  NEGATIVE Final    Protein, UA 01/07/2025 NEGATIVE  NEGATIVE Final    Nitrite, UA 01/07/2025 NEGATIVE  NEGATIVE Final    Leukocytes, UA 01/07/2025 NEGATIVE  NEGATIVE Final    WBC Casts, UA 01/07/2025 NONE SEEN  < OR = 5 /HPF Final    RBC Casts, UA 01/07/2025 NONE SEEN  < OR = 2 /HPF Final    Squam Epithel, UA 01/07/2025 NONE SEEN  < OR = 5 /HPF Final    Bacteria, UA 01/07/2025 NONE SEEN  NONE SEEN /HPF Final    Hyaline Casts, UA 01/07/2025 NONE SEEN  NONE SEEN /LPF Final    Service Cmt: 01/07/2025 SEE COMMENT   Final    Reflexive Urine Culture 01/07/2025 SEE COMMENT   Final   Office Visit on 12/17/2024   Component Date Value Ref Range Status    POC Rapid COVID 12/17/2024 Negative  Negative Final     Acceptable 12/17/2024 Yes   Final    POC Molecular Influenza A Ag 12/17/2024 Negative  Negative Final    POC Molecular Influenza B Ag 12/17/2024 Negative  Negative Final     Acceptable 12/17/2024 Yes   Final    Molecular Strep A, POC 12/17/2024 Negative  Negative Final     Acceptable 12/17/2024 Yes   Final   Patient Outreach on 08/15/2024   Component Date Value Ref Range Status    BMD Recommendation External 08/07/2024 Repeat BMD in 2 years   Final   Patient Outreach on 08/14/2024   Component Date Value Ref Range Status    BCS Recommendation External 08/07/2024 Repeat mammogram in 1 year   Final       Past Medical History:   Diagnosis Date    Diverticulosis     History of nuclear stress test 2015    Normal    Hyperlipidemia     Hypertension     Mitral  valve prolapse     Personal history of colonic polyps 11/01/2023     Social History     Socioeconomic History    Marital status:    Tobacco Use    Smoking status: Every Day     Types: Vaping with nicotine    Smokeless tobacco: Never   Substance and Sexual Activity    Drug use: Never     Past Surgical History:   Procedure Laterality Date    CHOLECYSTECTOMY      COLONOSCOPY  2014    Dr. Avilez RTC 10 years    COLONOSCOPY  11/01/2023    Repeat colonoscopy not recommended for surveillance per Dr Avilez    HYSTERECTOMY      LAPAROSCOPY       Family History   Problem Relation Name Age of Onset    Diabetes Mother      Heart disease Mother      Cancer Father      Breast cancer Maternal Grandmother         The CVD Risk score (Tyler et al., 2008) failed to calculate for the following reasons:    The 2008 CVD risk score is only valid for ages 30 to 74    All of your core healthy metrics are met.      Review of patient's allergies indicates:   Allergen Reactions    Meperidine     Penicillins        Current Outpatient Medications:     aspirin (ECOTRIN) 81 MG EC tablet, Take 81 mg by mouth once daily., Disp: , Rfl:     azelastine (ASTELIN) 137 mcg (0.1 %) nasal spray, , Disp: , Rfl:     alendronate (FOSAMAX) 70 MG tablet, Take 1 tablet (70 mg total) by mouth every 7 days., Disp: 12 tablet, Rfl: 3    amLODIPine (NORVASC) 5 MG tablet, Take 1 tablet (5 mg total) by mouth once daily., Disp: 90 tablet, Rfl: 3    levothyroxine (SYNTHROID) 25 MCG tablet, Take 1 tablet (25 mcg total) by mouth before breakfast., Disp: 90 tablet, Rfl: 3    omeprazole (PRILOSEC) 40 MG capsule, Take 1 capsule (40 mg total) by mouth once daily., Disp: 90 capsule, Rfl: 3    raloxifene (EVISTA) 60 mg tablet, Take 1 tablet (60 mg total) by mouth once daily., Disp: 90 tablet, Rfl: 3    rosuvastatin (CRESTOR) 5 MG tablet, Take 1 tablet (5 mg total) by mouth once daily., Disp: 90 tablet, Rfl: 3    Review of Systems   Constitutional:  Negative for  "appetite change, chills, fatigue, fever and unexpected weight change.   HENT:  Negative for ear discharge and ear pain.    Eyes:  Negative for pain, discharge and visual disturbance.   Respiratory:  Negative for apnea, cough, shortness of breath and wheezing.    Cardiovascular:  Negative for chest pain, palpitations and leg swelling.   Gastrointestinal:  Positive for abdominal pain and constipation. Negative for blood in stool, diarrhea, nausea, vomiting and reflux.   Endocrine: Negative for cold intolerance, heat intolerance and polydipsia.   Genitourinary:  Negative for bladder incontinence, dysuria, hematuria, nocturia and urgency.   Musculoskeletal:  Positive for arthralgias. Negative for gait problem, joint swelling and myalgias.   Neurological:  Negative for dizziness, seizures and numbness.   Psychiatric/Behavioral:  Negative for behavioral problems and hallucinations. The patient is not nervous/anxious.            Objective:      Vitals:    01/15/25 1019   BP: 120/70   Pulse: 99   SpO2: 99%   Weight: 57.2 kg (126 lb)   Height: 4' 11" (1.499 m)     Physical Exam  Vitals and nursing note reviewed.   Constitutional:       General: She is not in acute distress.     Appearance: Normal appearance. She is well-developed. She is not toxic-appearing.   HENT:      Head: Normocephalic and atraumatic.      Right Ear: Tympanic membrane and external ear normal.      Left Ear: Tympanic membrane and external ear normal.      Nose: Nose normal.      Mouth/Throat:      Pharynx: Oropharynx is clear. No posterior oropharyngeal erythema.   Eyes:      Pupils: Pupils are equal, round, and reactive to light.   Neck:      Thyroid: No thyromegaly.      Vascular: No carotid bruit.   Cardiovascular:      Rate and Rhythm: Normal rate and regular rhythm.      Heart sounds: Normal heart sounds. No murmur heard.  Pulmonary:      Effort: Pulmonary effort is normal.      Breath sounds: Normal breath sounds. No wheezing or rales.   Abdominal: "      General: Bowel sounds are normal. There is no distension.      Palpations: Abdomen is soft.      Tenderness: There is no abdominal tenderness.   Musculoskeletal:         General: No tenderness or deformity. Normal range of motion.      Cervical back: Normal range of motion and neck supple.      Lumbar back: Normal. No spasms.      Comments: Bends 90 degrees at  waist, shoulders and knees have full range of motion, no pitting edema to lower extremities, fingers have osteoarthritis changes and nodes., able to close hands in a fist well.   Lymphadenopathy:      Cervical: No cervical adenopathy.   Skin:     General: Skin is warm and dry.      Findings: No rash.   Neurological:      General: No focal deficit present.      Mental Status: She is alert and oriented to person, place, and time. Mental status is at baseline.      Cranial Nerves: No cranial nerve deficit.      Coordination: Coordination normal.   Psychiatric:         Mood and Affect: Mood normal.         Behavior: Behavior normal.         Thought Content: Thought content normal.         Judgment: Judgment normal.       Physical Exam    Cardiovascular: Normal heart sounds.  Pulmonary: Normal breath sounds.  Musculoskeletal: Normal knee inspection (Right). Normal knee inspection (Left).             Assessment:       1. Age-related osteoporosis without current pathological fracture    2. Essential hypertension    3. Gastroesophageal reflux disease without esophagitis    4. Pure hypercholesterolemia    5. Adult idiopathic generalized osteoporosis    6. Acquired hypothyroidism    7. Diverticulosis of large intestine without perforation or abscess without bleeding    8. Slow transit constipation    9. Primary hypertension    10. Other emphysema         Plan:       Age-related osteoporosis without current pathological fracture  -     alendronate (FOSAMAX) 70 MG tablet; Take 1 tablet (70 mg total) by mouth every 7 days.  Dispense: 12 tablet; Refill: 3  Continue  Evista, add alendronate once a week.  Essential hypertension  -     amLODIPine (NORVASC) 5 MG tablet; Take 1 tablet (5 mg total) by mouth once daily.  Dispense: 90 tablet; Refill: 3  Blood pressure well controlled  Gastroesophageal reflux disease without esophagitis  -     omeprazole (PRILOSEC) 40 MG capsule; Take 1 capsule (40 mg total) by mouth once daily.  Dispense: 90 capsule; Refill: 3    Pure hypercholesterolemia  -     rosuvastatin (CRESTOR) 5 MG tablet; Take 1 tablet (5 mg total) by mouth once daily.  Dispense: 90 tablet; Refill: 3  Cholesterol 151  LDL 67, excellent lipid panel  Adult idiopathic generalized osteoporosis  -     raloxifene (EVISTA) 60 mg tablet; Take 1 tablet (60 mg total) by mouth once daily.  Dispense: 90 tablet; Refill: 3    Acquired hypothyroidism  -     levothyroxine (SYNTHROID) 25 MCG tablet; Take 1 tablet (25 mcg total) by mouth before breakfast.  Dispense: 90 tablet; Refill: 3    Diverticulosis of large intestine without perforation or abscess without bleeding  Consider adding IB guard once a day for colon pain and gas pain  Slow transit constipation  Continue Senokot and or Dulcolax  Primary hypertension  Blood pressure well controlled  Other emphysema  Reduce vaping    Assessment & Plan    IMPRESSION:  - Assessed arthritis symptoms in hands and feet, noting progression  - Evaluated chronic GI issues, considering IBS as potential cause  - Reviewed recent colonoscopy results, noting multiple diverticula throughout colon  - Analyzed bone density scan results, identifying osteoporosis in spine and osteopenia in hips  - Considered treatment options for bone density issues, weighing oral medication vs. injectable options  - Reviewed recent lab work, noting healthy cholesterol, blood sugar, kidney, and liver function    OTHER EMPHYSEMA:  - Assessed patient's respiratory status: comfortable breathing, no shortness of breath or coughing.  - Physical exam revealed clear lungs without  rattling or rumbling.  - Discussed the patient's vaping habit and its potential impact on breathing health.    ARTHRITIS:  - Evaluated patient's arthritis symptoms, including pain and cracking in great toes (especially on one foot) and hands, with worsening crunching and cracking.  - Cold weather slightly exacerbates the condition.  - Assessed hand function; patient can make a fist but experiences pain, particularly in one finger.  - Sophia uses a hand machine for massage and heat therapy, though effectiveness is uncertain.  - Occasionally takes naproxen or ibuprofen for pain relief.    CONSTIPATION:  - Evaluated patient's constipation, which is affected by diet and medication adherence.  - Sophia uses Senokot daily to aid with stools and occasionally uses bisacodyl for severe constipation.  - Advised maintaining regular bowel movements using current methods.  - Recommend keeping bowels moving and suggested adding polyethylene glycol to current regimen if needed.    DIVERTICULOSIS:  - Evaluated patient's constant stomach issues, including bloating and gas pockets lasting 2-3 days.  - Suspected diverticular issues based on symptoms and previous colonoscopy results, which revealed multiple diverticula throughout the colon, without polyps.  - Educated patient about potential gas production from certain foods like cucumbers and tomatoes.  - Noted patient has tried simethicone and other remedies for gas relief.  - Recommend OTC IB Guard for gas and abdominal discomfort.  - Explained that diverticulosis is not cancer and will not shorten life, but requires symptom management focused on controlling symptoms and keeping bowels moving.    IRRITABLE BOWEL SYNDROME:  - Suspected irritable bowel syndrome based on patient's constant stomach issues, bloating, and discomfort.  - Recommend IB Guard, an OTC peppermint oil capsule, for symptom relief.    OSTEOPOROSIS:  - Reviewed recent bone density scan showing osteoporosis in the  spine and osteopenia in the hips, despite long-term use of daily alendronate.  - Discussed need for treatment to prevent future complications like hunching over or bone fractures.  - Prescribed calcium plus vitamin D supplement daily.  - Initiated once-weekly osteoporosis medication (to be called in to [facility]) and continued daily alendronate for bone health.  - Scheduled follow up in 2 years for Medicare-allowed bone density testing.    VAPING:  - Noted patient's current vaping habit.    HYPERLIPIDEMIA:  - Reviewed recent lipid panel showing excellent cholesterol levels: total cholesterol 151, triglycerides 119, LDL 67.  - Assessed that patient should not be at risk for heart disease based on current lipid levels.    MEDICATIONS/SUPPLEMENTS:  - Continued Omeprazole for acid indigestion.    FOLLOW UP:  - Follow up in 6 months and in summer for next mammogram.         Follow up in about 6 months (around 7/15/2025), or Hyperlipidemia, osteoporosis.        This note was generated with the assistance of ambient listening technology. Verbal consent was obtained by the patient and accompanying visitor(s) for the recording of patient appointment to facilitate this note. I attest to having reviewed and edited the generated note for accuracy, though some syntax or spelling errors may persist. Please contact the author of this note for any clarification.      1/15/2025 Deacon Davies

## 2025-01-27 ENCOUNTER — TELEPHONE (OUTPATIENT)
Dept: FAMILY MEDICINE | Facility: CLINIC | Age: 75
End: 2025-01-27
Payer: MEDICARE

## 2025-01-27 NOTE — TELEPHONE ENCOUNTER
----- Message from Karena sent at 1/27/2025  3:33 PM CST -----  Asking if she should take this medication or not Fosamax. Due to patient having to have dental work  and implants done because the medication messes with her jaw.   [phone]

## 2025-01-30 NOTE — TELEPHONE ENCOUNTER
"Spoke to pt and let her know per  " don't take fosamax if planning to get Dental implants" pt stated she's had the dental implants for several years but when she first got them she was on fosamax and they had to redo the implants 3 times because her bone was to soft. Pt wants to know if it will affect her implants now because she does not want them redone.  Does not plan on any dental surgery or work this year   "

## 2025-01-30 NOTE — TELEPHONE ENCOUNTER
"Per Dr. Davies "if she is already finished with dental surgery, okay to try Fosamax. Just don't take it if more surgery is planned." Spoke with patient and let her know.   "

## 2025-02-22 DIAGNOSIS — Z00.00 ENCOUNTER FOR MEDICARE ANNUAL WELLNESS EXAM: ICD-10-CM

## 2025-05-19 ENCOUNTER — OFFICE VISIT (OUTPATIENT)
Dept: FAMILY MEDICINE | Facility: CLINIC | Age: 75
End: 2025-05-19
Payer: MEDICARE

## 2025-05-19 VITALS
HEIGHT: 59 IN | HEART RATE: 88 BPM | OXYGEN SATURATION: 100 % | RESPIRATION RATE: 18 BRPM | BODY MASS INDEX: 25 KG/M2 | WEIGHT: 124 LBS | DIASTOLIC BLOOD PRESSURE: 68 MMHG | SYSTOLIC BLOOD PRESSURE: 126 MMHG

## 2025-05-19 DIAGNOSIS — G60.3 IDIOPATHIC PROGRESSIVE NEUROPATHY: ICD-10-CM

## 2025-05-19 DIAGNOSIS — N64.4 PAIN OF LEFT BREAST: ICD-10-CM

## 2025-05-19 DIAGNOSIS — G62.9 NEUROPATHY: Primary | ICD-10-CM

## 2025-05-19 DIAGNOSIS — J02.9 SORE THROAT: ICD-10-CM

## 2025-05-19 PROCEDURE — 99214 OFFICE O/P EST MOD 30 MIN: CPT | Mod: S$GLB,,, | Performed by: PHYSICIAN ASSISTANT

## 2025-05-19 NOTE — PROGRESS NOTES
"  SUBJECTIVE:    Patient ID: Sophia Alberto is a 74 y.o. female.    Chief Complaint: Follow-up (No bottles// no refills needed// pt states she has a lump under left arm also on and off tingling feeling in both arms//pt been having sore throat and headaches going on for 5 weeks )    History of Present Illness    CHIEF COMPLAINT:  Sophia presents today with concerns about persistent left-sided pain and numbness.    LEFT BREAST AND AXILLARY PAIN:  She reports pain and tenderness in the left axilla and tail of breast, describing the sensation as bruise-like. She has a positive family history of breast cancer in grandmother and great-grandmother. Last mammogram was performed in August of previous year.    NEUROLOGICAL SYMPTOMS:  She experiences intermittent tingling and numbness down her left arm, described as a "numbness tingling rush." These symptoms are exclusively left-sided. Sleep position does not affect symptoms, as she typically sleeps on her right side. She also reports frequent headaches.    ENT SYMPTOMS:  She reports persistent red throat with tenderness to palpation and a bruise-like sensation. She experiences a sensation extending upward through the throat region.    CONSTITUTIONAL SYMPTOMS:  She reports experiencing jitteriness and occasional episodes of nausea.    MEDICATIONS:  She takes Tylenol and ibuprofen for symptom management. She was recently prescribed Fosamax but expresses hesitancy to continue due to previous dental complications, including multiple implant procedures.    SOCIAL HISTORY:  She is a former cigarette smoker with 50-year smoking history who quit in 2017. She currently uses Juul vape.      ROS:  General: -fever, -chills, -fatigue, -weight gain, -weight loss  Eyes: -vision changes, -redness, -discharge  ENT: -ear pain, -nasal congestion, +sore throat  Cardiovascular: -chest pain, -palpitations, -lower extremity edema  Respiratory: -cough, -shortness of breath  Gastrointestinal: " -abdominal pain, +nausea, -vomiting, -diarrhea, -constipation, -blood in stool  Genitourinary: -dysuria, -hematuria, -frequency  Musculoskeletal: -joint pain, -muscle pain  Skin: -rash, -lesion  Neurological: +headache, -dizziness, +numbness, +tingling  Psychiatric: -anxiety, -depression, -sleep difficulty  Breasts: +breast pain         Telephone on 12/26/2024   Component Date Value Ref Range Status    Glucose 01/07/2025 116 (H)  65 - 99 mg/dL Final    BUN 01/07/2025 12  7 - 25 mg/dL Final    Creatinine 01/07/2025 0.63  0.60 - 1.00 mg/dL Final    eGFR 01/07/2025 92  > OR = 60 mL/min/1.73m2 Final    BUN/Creatinine Ratio 01/07/2025 SEE NOTE:  6 - 22 (calc) Final    Sodium 01/07/2025 141  135 - 146 mmol/L Final    Potassium 01/07/2025 3.9  3.5 - 5.3 mmol/L Final    Chloride 01/07/2025 104  98 - 110 mmol/L Final    CO2 01/07/2025 29  20 - 32 mmol/L Final    Calcium 01/07/2025 8.7  8.6 - 10.4 mg/dL Final    Total Protein 01/07/2025 6.2  6.1 - 8.1 g/dL Final    Albumin 01/07/2025 4.1  3.6 - 5.1 g/dL Final    Globulin, Total 01/07/2025 2.1  1.9 - 3.7 g/dL (calc) Final    Albumin/Globulin Ratio 01/07/2025 2.0  1.0 - 2.5 (calc) Final    Total Bilirubin 01/07/2025 0.7  0.2 - 1.2 mg/dL Final    Alkaline Phosphatase 01/07/2025 78  37 - 153 U/L Final    AST 01/07/2025 20  10 - 35 U/L Final    ALT 01/07/2025 22  6 - 29 U/L Final    Cholesterol 01/07/2025 151  <200 mg/dL Final    HDL 01/07/2025 63  > OR = 50 mg/dL Final    Triglycerides 01/07/2025 119  <150 mg/dL Final    LDL Cholesterol 01/07/2025 67  mg/dL (calc) Final    HDL/Cholesterol Ratio 01/07/2025 2.4  <5.0 (calc) Final    Non HDL Chol. (LDL+VLDL) 01/07/2025 88  <130 mg/dL (calc) Final    Creatinine, Urine 01/07/2025 74  20 - 275 mg/dL Final    Microalb, Ur 01/07/2025 0.5  See Note: mg/dL Final    Microalb/Creat Ratio 01/07/2025 7  <30 mg/g creat Final    Color, UA 01/07/2025 YELLOW  YELLOW Final    Appearance, UA 01/07/2025 CLOUDY (A)  CLEAR Final    Specific [place], UA  01/07/2025 1.013  1.001 - 1.035 Final    pH, UA 01/07/2025 7.5  5.0 - 8.0 Final    Glucose, UA 01/07/2025 NEGATIVE  NEGATIVE Final    Bilirubin, UA 01/07/2025 NEGATIVE  NEGATIVE Final    Ketones, UA 01/07/2025 NEGATIVE  NEGATIVE Final    Occult Blood UA 01/07/2025 NEGATIVE  NEGATIVE Final    Protein, UA 01/07/2025 NEGATIVE  NEGATIVE Final    Nitrite, UA 01/07/2025 NEGATIVE  NEGATIVE Final    Leukocytes, UA 01/07/2025 NEGATIVE  NEGATIVE Final    WBC Casts, UA 01/07/2025 NONE SEEN  < OR = 5 /HPF Final    RBC Casts, UA 01/07/2025 NONE SEEN  < OR = 2 /HPF Final    Squam Epithel, UA 01/07/2025 NONE SEEN  < OR = 5 /HPF Final    Bacteria, UA 01/07/2025 NONE SEEN  NONE SEEN /HPF Final    Hyaline Casts, UA 01/07/2025 NONE SEEN  NONE SEEN /LPF Final    Service Cmt: 01/07/2025 SEE COMMENT   Final    Reflexive Urine Culture 01/07/2025 SEE COMMENT   Final   Office Visit on 12/17/2024   Component Date Value Ref Range Status    POC Rapid COVID 12/17/2024 Negative  Negative Final     Acceptable 12/17/2024 Yes   Final    POC Molecular Influenza A Ag 12/17/2024 Negative  Negative Final    POC Molecular Influenza B Ag 12/17/2024 Negative  Negative Final     Acceptable 12/17/2024 Yes   Final    Molecular Strep A, POC 12/17/2024 Negative  Negative Final     Acceptable 12/17/2024 Yes   Final       Past Medical History:   Diagnosis Date    Diverticulosis     History of nuclear stress test 2015    Normal    Hyperlipidemia     Hypertension     Mitral valve prolapse     Personal history of colonic polyps 11/01/2023     Past Surgical History:   Procedure Laterality Date    CHOLECYSTECTOMY      COLONOSCOPY  2014    Dr. Avilez RTC 10 years    COLONOSCOPY  11/01/2023    Repeat colonoscopy not recommended for surveillance per Dr Avilez    HYSTERECTOMY      LAPAROSCOPY       Family History   Problem Relation Name Age of Onset    Diabetes Mother      Heart disease Mother      Cancer Father       "Breast cancer Maternal Grandmother         Marital Status:   Alcohol History:  has no history on file for alcohol use.  Tobacco History:  reports that she has been smoking vaping with nicotine. She has never used smokeless tobacco.  Drug History:  reports no history of drug use.    Review of patient's allergies indicates:   Allergen Reactions    Meperidine     Penicillins      Current Medications[1]    Objective:      Vitals:    05/19/25 1535   BP: 126/68   Pulse: 88   Resp: 18   SpO2: 100%   Weight: 56.2 kg (124 lb)   Height: 4' 11" (1.499 m)     Physical Exam    Vitals: Weight: 124 lbs. SpO2 at 100 percent.  General: No acute distress. Well-developed. Well-nourished.  Eyes: EOMI. Sclerae anicteric.  HENT: Normocephalic. Atraumatic. Nares patent. Moist oral mucosa.  Ears: Bilateral TMs clear. Bilateral EACs clear.  Cardiovascular: Regular rate. Regular rhythm. No murmurs. No rubs. No gallops. Normal S1, S2. Heart sounds great.  Respiratory: Normal respiratory effort. Clear to auscultation bilaterally. No rales. No rhonchi. No wheezing.  Abdomen: Soft. Non-tender. Non-distended. Normoactive bowel sounds.  Musculoskeletal: No  obvious deformity. Full range of motion in shoulder.  Extremities: No lower extremity edema.  Neurological: Alert & oriented x3. No slurred speech. Normal gait.  Psychiatric: Normal mood. Normal affect. Good insight. Good judgment.  Skin: Warm. Dry. No rash.  Lymphatics: No lump in axilla.         Assessment:       Assessment & Plan    - Considered gabapentin for intermittent nerve pain.    BREAST PAIN AND SYMPTOMS:  - Sophia reports pain in the left breast area, specifically at the tail of the breast up to the axilla, with occasional tingling sensations radiating down the arm.  - Examination of the axilla revealed no palpable lump.  - Ordered diagnostic mammogram with ultrasound to evaluate left breast pain and axillary soft tissue area.  - Of note, patient has significant family " history of breast cancer affecting grandmother and great-grandmother.    SKIN SENSATION DISTURBANCES:  - Sophia experiences numbness and tingling in the left arm, occasionally radiating to the fingers.  - Tinel's test was negative with no shockwaves transmitted to fingers when tapping on the median nerve.  - Ordered nerve conduction study of left upper extremity and referred to physical medicine and rehabilitation for evaluation of potential nerve compression.    THROAT PAIN:  - Sophia reports chronic erythematous throat with sensation of pressure rather than pain.  - Examination of pharynx was within normal limits.  - Referred to otolaryngologist for laryngoscopic evaluation to rule out pathologies including malignancy of the larynx and esophagus.    HEADACHE:  - Sophia reports frequent headaches, currently self-medicating with acetaminophen and ibuprofen for symptomatic relief.    TOBACCO USE AND NICOTINE DEPENDENCE:  - Sophia quit cigarette smoking in 2017 after a 50-year history of tobacco use but currently uses Juul electronic cigarette for nicotine administration.  - Ordered chest XR to evaluate lung fields.    FAMILY HISTORY OF BREAST CANCER:  - Significant family history of breast cancer affecting grandmother and great-grandmother.  - This has been considered in the breast pain evaluation plan.    FOLLOW-UP:  - Follow up as scheduled with Dr. Davies in July.  - Sophia advised to contact the office if needed to adjust treatment plan or order additional tests after test results are reviewed.       Plan:       Neuropathy  -     EMG W/ ULTRASOUND AND NERVE CONDUCTION TEST 1 Extremity; Future    Pain of left breast  -     X-Ray Chest PA And Lateral; Future; Expected date: 05/19/2025  -     Mammo Digital Diagnostic Left; Future; Expected date: 05/19/2025  -     US Breast Left Limited; Future; Expected date: 05/19/2025  -     X-Ray Chest PA And Lateral; Future; Expected date: 05/19/2025  -     Mammo Digital  Diagnostic Left; Future; Expected date: 05/19/2025  -     US Breast Left Limited; Future; Expected date: 05/19/2025    Idiopathic progressive neuropathy  -     EMG W/ ULTRASOUND AND NERVE CONDUCTION TEST 1 Extremity; Future    Sore throat  -     Ambulatory referral/consult to ENT; Future; Expected date: 05/19/2025      No follow-ups on file.    This note was generated with the assistance of ambient listening technology. Verbal consent was obtained by the patient and accompanying visitor(s) for the recording of patient appointment to facilitate this note. I attest to having reviewed and edited the generated note for accuracy, though some syntax or spelling errors may persist. Please contact the author of this note for any clarification.          5/19/2025 Elvin Gonzalez PA-C           [1]   Current Outpatient Medications:     alendronate (FOSAMAX) 70 MG tablet, Take 1 tablet (70 mg total) by mouth every 7 days., Disp: 12 tablet, Rfl: 3    amLODIPine (NORVASC) 5 MG tablet, Take 1 tablet (5 mg total) by mouth once daily., Disp: 90 tablet, Rfl: 3    aspirin (ECOTRIN) 81 MG EC tablet, Take 81 mg by mouth once daily., Disp: , Rfl:     levothyroxine (SYNTHROID) 25 MCG tablet, Take 1 tablet (25 mcg total) by mouth before breakfast., Disp: 90 tablet, Rfl: 3    omeprazole (PRILOSEC) 40 MG capsule, Take 1 capsule (40 mg total) by mouth once daily., Disp: 90 capsule, Rfl: 3    raloxifene (EVISTA) 60 mg tablet, Take 1 tablet (60 mg total) by mouth once daily., Disp: 90 tablet, Rfl: 3    rosuvastatin (CRESTOR) 5 MG tablet, Take 1 tablet (5 mg total) by mouth once daily., Disp: 90 tablet, Rfl: 3    azelastine (ASTELIN) 137 mcg (0.1 %) nasal spray, , Disp: , Rfl:

## 2025-05-27 ENCOUNTER — TELEPHONE (OUTPATIENT)
Dept: PHYSICAL MEDICINE AND REHAB | Facility: CLINIC | Age: 75
End: 2025-05-27
Payer: MEDICARE

## 2025-07-07 ENCOUNTER — TELEPHONE (OUTPATIENT)
Dept: FAMILY MEDICINE | Facility: CLINIC | Age: 75
End: 2025-07-07
Payer: MEDICARE

## 2025-07-11 ENCOUNTER — TELEPHONE (OUTPATIENT)
Dept: NEUROLOGY | Facility: CLINIC | Age: 75
End: 2025-07-11
Payer: MEDICARE

## 2025-07-11 NOTE — TELEPHONE ENCOUNTER
Spoke with patient and inquire if she wanted to schedule the EMG study.  She said she has an appt with her primary care doctor to talk over everything that is going on at this time.  She will call back to schedule, once she talks to her primary.  Will defer EMG referral until then.

## 2025-07-17 ENCOUNTER — OFFICE VISIT (OUTPATIENT)
Dept: FAMILY MEDICINE | Facility: CLINIC | Age: 75
End: 2025-07-17
Payer: MEDICARE

## 2025-07-17 ENCOUNTER — TELEPHONE (OUTPATIENT)
Dept: FAMILY MEDICINE | Facility: CLINIC | Age: 75
End: 2025-07-17

## 2025-07-17 VITALS
SYSTOLIC BLOOD PRESSURE: 118 MMHG | DIASTOLIC BLOOD PRESSURE: 70 MMHG | WEIGHT: 125.81 LBS | BODY MASS INDEX: 25.36 KG/M2 | OXYGEN SATURATION: 98 % | HEART RATE: 89 BPM | HEIGHT: 59 IN

## 2025-07-17 DIAGNOSIS — I10 ESSENTIAL HYPERTENSION: ICD-10-CM

## 2025-07-17 DIAGNOSIS — E78.00 PURE HYPERCHOLESTEROLEMIA: ICD-10-CM

## 2025-07-17 DIAGNOSIS — M19.90 ARTHRITIS: Primary | ICD-10-CM

## 2025-07-17 DIAGNOSIS — K21.9 GASTROESOPHAGEAL REFLUX DISEASE WITHOUT ESOPHAGITIS: ICD-10-CM

## 2025-07-17 DIAGNOSIS — E03.9 ACQUIRED HYPOTHYROIDISM: ICD-10-CM

## 2025-07-17 DIAGNOSIS — M81.8 ADULT IDIOPATHIC GENERALIZED OSTEOPOROSIS: ICD-10-CM

## 2025-07-17 DIAGNOSIS — E78.49 OTHER HYPERLIPIDEMIA: ICD-10-CM

## 2025-07-17 PROCEDURE — 99214 OFFICE O/P EST MOD 30 MIN: CPT | Mod: S$GLB,,, | Performed by: FAMILY MEDICINE

## 2025-07-17 RX ORDER — LEVOTHYROXINE SODIUM 25 UG/1
25 TABLET ORAL
Qty: 90 TABLET | Refills: 3 | Status: SHIPPED | OUTPATIENT
Start: 2025-07-17 | End: 2026-07-17

## 2025-07-17 RX ORDER — OMEPRAZOLE 40 MG/1
40 CAPSULE, DELAYED RELEASE ORAL DAILY
Qty: 90 CAPSULE | Refills: 3 | Status: SHIPPED | OUTPATIENT
Start: 2025-07-17

## 2025-07-17 RX ORDER — AMLODIPINE BESYLATE 5 MG/1
5 TABLET ORAL DAILY
Qty: 90 TABLET | Refills: 3 | Status: SHIPPED | OUTPATIENT
Start: 2025-07-17

## 2025-07-17 RX ORDER — RALOXIFENE HYDROCHLORIDE 60 MG/1
60 TABLET, FILM COATED ORAL DAILY
Qty: 90 TABLET | Refills: 3 | Status: SHIPPED | OUTPATIENT
Start: 2025-07-17

## 2025-07-17 RX ORDER — ROSUVASTATIN CALCIUM 5 MG/1
5 TABLET, COATED ORAL DAILY
Qty: 90 TABLET | Refills: 3 | Status: SHIPPED | OUTPATIENT
Start: 2025-07-17

## 2025-07-17 NOTE — PROGRESS NOTES
"  SUBJECTIVE:    Patient ID: Sophia Alberto is a 74 y.o. female.    Chief Complaint: Follow-up (Bottles brought//Pt is here for a follow up on hyperlipidemia, osteoporosis, and she has other things going on that she needs to discuss-pt has been having issues with her right foot for a month now, denies injury to it, woke up one day with the top of her foot hurting, she has been limping, getting muscle spasms, having sharp pains in her legs-has been using ice with a little relief, ibuprofen with some relief//KE)    Follow-up        History of Present Illness    CHIEF COMPLAINT:  Sophia presents today for right foot and leg pain.    RIGHT FOOT PAIN:  She reports sudden onset right foot pain approximately 1.5 months ago without injury or precipitating event. The foot is puffy and swollen, with tenderness over the metatarsals upon pressure. Pain has progressively worsened, significantly impacting mobility and causing her to limp. She denies history of foot fractures, stress fractures, or prior trauma. She manages symptoms with ibuprofen and occasional ice application for temporary relief, and wears soft shoes for comfort.    KNEE PAIN:  She reports bilateral knee pain with mechanical symptoms, describing a "crunchy" sensation with grinding during movement. She experiences leg shakiness and twitching, particularly while driving, which she manages with a knee brace. She is unable to stand for more than 30 minutes without discomfort. She denies ligament tear or significant knee instability but suspects early arthritic changes with fluid accumulation.    HAND PAIN:  She reports ongoing hand pain consistent with potential arthritic symptoms, noting similar hand issues in her brother-in-law and two sisters. She previously used a massage machine daily for relief but has recently reduced frequency.    CHEST AND AXILLARY PAIN:  She reports history of chest and axillary pain with previous radiation to arm causing tingling and " unusual sensation. Pain has decreased in intensity and is now primarily localized to the axilla, occurring intermittently with occasional soreness. She denies significant functional limitations in shoulder movement. Mammogram in June was normal. Chest XR results are pending.    SOCIAL HISTORY:  She reports electronic cigarette use approximately 2.5 days per week.    LABS:  Thyroid function, blood sugar, kidney function, liver function, and CBC were normal. Lipid panel showed total cholesterol 143, triglycerides 129, and LDL cholesterol 58.      ROS:  Constitutional: -appetite change, -chills, -fatigue, -fever, -unexpected weight change  HENT: -ear pain, -trouble swallowing  Eyes: -pain, -discharge, -visual disturbance  Respiratory: -apnea, -cough, -shortness of breath, -wheezing  Cardiovascular: -chest pain, +leg swelling, +feelings of fast heart rate, +palpitations  Gastrointestinal: -abdominal pain, -blood in stool, -constipation, -diarrhea, -nausea, -vomiting, -reflux  Endocrine: -cold intolerance, -heat intolerance, -polydipsia  Genitourinary: -bladder incontinence, -dysuria, -erectile dysfunction, -frequency, -hematuria, -testicular pain, -urgency, -nocturia  Musculoskeletal: -gait problem, -joint swelling, -myalgia, +limb pain, +limb swelling, +muscle spasms, +pain with movement, +joint pain  Neurological: -dizziness, -seizures, +numbness  Psychiatric/Behavioral: -agitation, -hallucinations, -nervous, -anxiety symptoms         No visits with results within 6 Month(s) from this visit.   Latest known visit with results is:   Office Visit on 01/15/2025   Component Date Value Ref Range Status    WBC 07/09/2025 8.1  3.8 - 10.8 Thousand/uL Final    RBC 07/09/2025 4.69  3.80 - 5.10 Million/uL Final    Hemoglobin 07/09/2025 14.8  11.7 - 15.5 g/dL Final    Hematocrit 07/09/2025 44.7  35.0 - 45.0 % Final    MCV 07/09/2025 95.3  80.0 - 100.0 fL Final    MCH 07/09/2025 31.6  27.0 - 33.0 pg Final    MCHC 07/09/2025 33.1   32.0 - 36.0 g/dL Final    RDW 07/09/2025 12.7  11.0 - 15.0 % Final    Platelets 07/09/2025 237  140 - 400 Thousand/uL Final    MPV 07/09/2025 10.5  7.5 - 12.5 fL Final    Neutrophils, Abs 07/09/2025 5,265  1,500 - 7,800 cells/uL Final    Lymph # 07/09/2025 2,090  850 - 3,900 cells/uL Final    Mono # 07/09/2025 551  200 - 950 cells/uL Final    Eos # 07/09/2025 138  15 - 500 cells/uL Final    Baso # 07/09/2025 57  0 - 200 cells/uL Final    Neutrophils Relative 07/09/2025 65  % Final    Lymph % 07/09/2025 25.8  % Final    Mono % 07/09/2025 6.8  % Final    Eosinophil % 07/09/2025 1.7  % Final    Basophil % 07/09/2025 0.7  % Final    Glucose 07/09/2025 110 (H)  65 - 99 mg/dL Final    BUN 07/09/2025 11  7 - 25 mg/dL Final    Creatinine 07/09/2025 0.71  0.60 - 1.00 mg/dL Final    eGFR 07/09/2025 89  > OR = 60 mL/min/1.73m2 Final    BUN/Creatinine Ratio 07/09/2025 SEE NOTE:  6 - 22 (calc) Final    Sodium 07/09/2025 143  135 - 146 mmol/L Final    Potassium 07/09/2025 4.4  3.5 - 5.3 mmol/L Final    Chloride 07/09/2025 105  98 - 110 mmol/L Final    CO2 07/09/2025 31  20 - 32 mmol/L Final    Calcium 07/09/2025 9.1  8.6 - 10.4 mg/dL Final    Total Protein 07/09/2025 6.1  6.1 - 8.1 g/dL Final    Albumin 07/09/2025 4.0  3.6 - 5.1 g/dL Final    Globulin, Total 07/09/2025 2.1  1.9 - 3.7 g/dL (calc) Final    Albumin/Globulin Ratio 07/09/2025 1.9  1.0 - 2.5 (calc) Final    Total Bilirubin 07/09/2025 0.6  0.2 - 1.2 mg/dL Final    Alkaline Phosphatase 07/09/2025 49  37 - 153 U/L Final    AST 07/09/2025 21  10 - 35 U/L Final    ALT 07/09/2025 22  6 - 29 U/L Final    Cholesterol 07/09/2025 143  <200 mg/dL Final    HDL 07/09/2025 64  > OR = 50 mg/dL Final    Triglycerides 07/09/2025 129  <150 mg/dL Final    LDL Cholesterol 07/09/2025 58  mg/dL (calc) Final    HDL/Cholesterol Ratio 07/09/2025 2.2  <5.0 (calc) Final    Non HDL Chol. (LDL+VLDL) 07/09/2025 79  <130 mg/dL (calc) Final    TSH 07/09/2025 2.51  0.40 - 4.50 mIU/L Final    WBC  "Casts, UA 07/09/2025 NONE SEEN  < OR = 5 /HPF Final    RBC Casts, UA 07/09/2025 NONE SEEN  < OR = 2 /HPF Final    Squam Epithel, UA 07/09/2025 NONE SEEN  < OR = 5 /HPF Final    Bacteria, UA 07/09/2025 NONE SEEN  NONE SEEN /HPF Final    Hyaline Casts, UA 07/09/2025 NONE SEEN  NONE SEEN /LPF Final    Service Cmt: 07/09/2025 SEE COMMENT   Final       Past Medical History:   Diagnosis Date    Diverticulosis     History of nuclear stress test 2015    Normal    Hyperlipidemia     Hypertension     Mitral valve prolapse     Personal history of colonic polyps 11/01/2023     Social History[1]  Past Surgical History:   Procedure Laterality Date    CHOLECYSTECTOMY      COLONOSCOPY  2014    Dr. Avilez RTC 10 years    COLONOSCOPY  11/01/2023    Repeat colonoscopy not recommended for surveillance per Dr Avilez    HYSTERECTOMY      LAPAROSCOPY       Family History   Problem Relation Name Age of Onset    Diabetes Mother      Heart disease Mother      Cancer Father      Breast cancer Maternal Grandmother         The CVD Risk score (Tyler et al., 2008) failed to calculate for the following reasons:    The 2008 CVD risk score is only valid for ages 30 to 74    Tests to Keep You Healthy    Last Blood Pressure <= 139/89 (7/17/2025): Yes  Tobacco Cessation: NO      Review of patient's allergies indicates:   Allergen Reactions    Meperidine     Penicillins      Current Medications[2]    Review of Systems        Objective:      Vitals:    07/17/25 1014   BP: 118/70   Pulse: 89   SpO2: 98%   Weight: 57.1 kg (125 lb 12.8 oz)   Height: 4' 11" (1.499 m)     Physical Exam  Vitals and nursing note reviewed.   Constitutional:       General: She is not in acute distress.     Appearance: Normal appearance. She is well-developed. She is not toxic-appearing.   HENT:      Head: Normocephalic and atraumatic.      Right Ear: Tympanic membrane and external ear normal.      Left Ear: Tympanic membrane and external ear normal.      Nose: Nose " normal.      Mouth/Throat:      Pharynx: Oropharynx is clear. No posterior oropharyngeal erythema.   Eyes:      Pupils: Pupils are equal, round, and reactive to light.   Neck:      Thyroid: No thyromegaly.      Vascular: No carotid bruit.   Cardiovascular:      Rate and Rhythm: Normal rate and regular rhythm.      Heart sounds: Normal heart sounds. No murmur heard.  Pulmonary:      Effort: Pulmonary effort is normal.      Breath sounds: Normal breath sounds. No wheezing or rales.   Abdominal:      General: Bowel sounds are normal. There is no distension.      Palpations: Abdomen is soft.      Tenderness: There is no abdominal tenderness.   Musculoskeletal:         General: No tenderness or deformity. Normal range of motion.      Cervical back: Normal range of motion and neck supple.      Lumbar back: Normal. No spasms.      Comments: Bends 90 degrees at  waist, shoulders and knees have full range of motion, no pitting edema to lower extremities, right forefoot is swollen and tender., she walks on the lateral side of her foot and limps, knees crepitant bilaterally   Lymphadenopathy:      Cervical: No cervical adenopathy.   Skin:     General: Skin is warm and dry.      Findings: No rash.   Neurological:      General: No focal deficit present.      Mental Status: She is alert and oriented to person, place, and time. Mental status is at baseline.      Cranial Nerves: No cranial nerve deficit.      Coordination: Coordination normal.      Gait: Gait abnormal.   Psychiatric:         Mood and Affect: Mood normal.         Behavior: Behavior normal.         Thought Content: Thought content normal.         Judgment: Judgment normal.       Physical Exam    Musculoskeletal: Posterior knee effusion. Crepitus in bilateral knees. Negative anterior drawer test. Normal knee stability.  MSK: Foot/Ankle - Right: Right foot edema. Tenderness over right metatarsals.  Vitals: Normal blood pressure.             Assessment:       1.  Arthritis    2. Pure hypercholesterolemia    3. Other hyperlipidemia    4. Adult idiopathic generalized osteoporosis    5. Gastroesophageal reflux disease without esophagitis    6. Acquired hypothyroidism    7. Essential hypertension         Plan:       Arthritis  -     X-Ray Foot Complete Right; Future; Expected date: 07/17/2025  -     X-Ray Knee 3 View Right; Future; Expected date: 07/17/2025  -     Uric Acid; Future; Expected date: 07/17/2025  -     Sedimentation rate; Future; Expected date: 07/17/2025  Evaluate for arthritis with x-rays of knees and foot, rule out gout with some lab work.  Continue ibuprofen and ice for now.  Pure hypercholesterolemia  -     rosuvastatin (CRESTOR) 5 MG tablet; Take 1 tablet (5 mg total) by mouth once daily.  Dispense: 90 tablet; Refill: 3    Other hyperlipidemia  Cholesterol excellent at 143 HDL 64  LDL 58  Adult idiopathic generalized osteoporosis  -     raloxifene (EVISTA) 60 mg tablet; Take 1 tablet (60 mg total) by mouth once daily.  Dispense: 90 tablet; Refill: 3    Gastroesophageal reflux disease without esophagitis  -     omeprazole (PRILOSEC) 40 MG capsule; Take 1 capsule (40 mg total) by mouth once daily.  Dispense: 90 capsule; Refill: 3    Acquired hypothyroidism  -     levothyroxine (SYNTHROID) 25 MCG tablet; Take 1 tablet (25 mcg total) by mouth before breakfast.  Dispense: 90 tablet; Refill: 3    Essential hypertension  -     amLODIPine (NORVASC) 5 MG tablet; Take 1 tablet (5 mg total) by mouth once daily.  Dispense: 90 tablet; Refill: 3      Assessment & Plan    M25.571 Pain in right ankle and joints of right foot  R60.0 Localized edema  M25.561 Pain in right knee  M25.549 Pain in joints of unspecified hand  R07.89 Other chest pain  F17.290 Nicotine dependence, other tobacco product, uncomplicated  Z85.3 Personal history of malignant neoplasm of breast    PAIN IN RIGHT FOOT:  - Sophia has been limping for 1 month and a half due to right foot pain described  as hard, sometimes tight, and hurts when bending or pushing on the metatarsals.  - Considering possible gout, arthritis, or hairline/stress fracture given prolonged duration of symptoms.  - Ordered right foot radiograph and blood test including uric acid to investigate for gout and arthritis.  - Advised patient to wear tennis shoes or walking shoes with cushioned soles for better foot support instead of slippers or flip-flops.  - Recommend continuing ibuprofen as needed for pain relief.    LOCALIZED EDEMA (SWELLING):  - Right foot is puffy and sometimes pinker, with swelling affecting the whole leg.  - This swelling is persistent and may be related to the foot pain.  - Cause being investigated through the previously mentioned labs for gout and arthritis.    PAIN IN RIGHT KNEE:  - Sophia experiences twitching, muscle spasms, and pain in the right knee, which sometimes hurts more than the left knee.  - Knee is crunchy and grinds when bent, indicating possible arthritis.  - Ordered right knee radiograph to investigate.  - Advised patient to continue using knee brace while driving for comfort and to wear tennis shoes with cushioned soles for better support.  - Continuing ibuprofen as needed for pain relief.    PAIN IN JOINTS OF HAND:  - Sophia experiences pain in the hands and has previously used a massaging machine for relief, though not recently.    OTHER CHEST PAIN:  - Sophia reports occasional soreness in the axilla area, not as frequent as before.  - Noted missing chest radiograph results from May.  - Will follow up to obtain these results and advised patient to contact the office regarding these findings.    NICOTINE DEPENDENCE:  - Sophia is using vape cartridges, each lasting about 2.5 days.    PERSONAL HISTORY OF BREAST CANCER:  - Reviewed recent mammogram results, which showed no evidence of malignancy.    FOLLOW-UP:  - Reviewed recent labs, noting normal thyroid, cholesterol, sugar, kidney, and liver  function.  - Sophia to follow up in 6 months for medication review and follow-up on ordered tests.         Follow up in about 6 months (around 1/17/2026).        This note was generated with the assistance of ambient listening technology. Verbal consent was obtained by the patient and accompanying visitor(s) for the recording of patient appointment to facilitate this note. I attest to having reviewed and edited the generated note for accuracy, though some syntax or spelling errors may persist. Please contact the author of this note for any clarification.      7/17/2025 Deacon Davies           [1]   Social History  Socioeconomic History    Marital status:    Tobacco Use    Smoking status: Every Day     Types: Vaping with nicotine    Smokeless tobacco: Never   Substance and Sexual Activity    Drug use: Never   [2]   Current Outpatient Medications:     aspirin (ECOTRIN) 81 MG EC tablet, Take 81 mg by mouth once daily., Disp: , Rfl:     alendronate (FOSAMAX) 70 MG tablet, Take 1 tablet (70 mg total) by mouth every 7 days. (Patient not taking: Reported on 7/17/2025), Disp: 12 tablet, Rfl: 3    amLODIPine (NORVASC) 5 MG tablet, Take 1 tablet (5 mg total) by mouth once daily., Disp: 90 tablet, Rfl: 3    azelastine (ASTELIN) 137 mcg (0.1 %) nasal spray, , Disp: , Rfl:     levothyroxine (SYNTHROID) 25 MCG tablet, Take 1 tablet (25 mcg total) by mouth before breakfast., Disp: 90 tablet, Rfl: 3    omeprazole (PRILOSEC) 40 MG capsule, Take 1 capsule (40 mg total) by mouth once daily., Disp: 90 capsule, Rfl: 3    raloxifene (EVISTA) 60 mg tablet, Take 1 tablet (60 mg total) by mouth once daily., Disp: 90 tablet, Rfl: 3    rosuvastatin (CRESTOR) 5 MG tablet, Take 1 tablet (5 mg total) by mouth once daily., Disp: 90 tablet, Rfl: 3

## 2025-07-17 NOTE — TELEPHONE ENCOUNTER
Spoke with patient and let her know we got a copy of her June CXR and it was normal - per Dr. Davies

## 2025-07-18 LAB
ERYTHROCYTE [SEDIMENTATION RATE] IN BLOOD BY WESTERGREN METHOD: 2 MM/H
URATE SERPL-MCNC: 4.1 MG/DL (ref 2.5–7)

## 2025-07-18 RX ORDER — PREDNISONE 10 MG/1
10 TABLET ORAL 2 TIMES DAILY
Qty: 20 TABLET | Refills: 0 | Status: SHIPPED | OUTPATIENT
Start: 2025-07-18

## 2025-07-18 NOTE — TELEPHONE ENCOUNTER
"Per Dr. Davies "received xray results - "Moderate arthritis present in knee join, no fractures seen. Osteoarthritis present in the great toe joint. No fractures seen. Try Prednisone 10mg po BID x 10 days for arthritis" LMOR for patient to call back.   "

## 2025-07-23 ENCOUNTER — RESULTS FOLLOW-UP (OUTPATIENT)
Dept: FAMILY MEDICINE | Facility: CLINIC | Age: 75
End: 2025-07-23
Payer: MEDICARE

## 2025-07-24 NOTE — TELEPHONE ENCOUNTER
Spoke with patient and let her know the results per Dr. Davies. Would like to think about a referral and give us a call.

## 2025-08-04 ENCOUNTER — TELEPHONE (OUTPATIENT)
Dept: NEUROLOGY | Facility: CLINIC | Age: 75
End: 2025-08-04
Payer: MEDICARE

## 2025-08-04 NOTE — TELEPHONE ENCOUNTER
Spoke with patient about rescheduling the nerve test and has she made up her mind about scheduling.  Patient stated her doctor did not bring it up and we can cancel the referral.